# Patient Record
Sex: MALE | Race: WHITE | NOT HISPANIC OR LATINO | Employment: FULL TIME | URBAN - METROPOLITAN AREA
[De-identification: names, ages, dates, MRNs, and addresses within clinical notes are randomized per-mention and may not be internally consistent; named-entity substitution may affect disease eponyms.]

---

## 2017-01-10 ENCOUNTER — OFFICE VISIT - HEALTHEAST (OUTPATIENT)
Dept: FAMILY MEDICINE | Facility: CLINIC | Age: 47
End: 2017-01-10

## 2017-01-10 DIAGNOSIS — Z22.322 MRSA (METHICILLIN RESISTANT STAPH AUREUS) CULTURE POSITIVE: ICD-10-CM

## 2017-01-10 DIAGNOSIS — L40.9 PSORIASIS: ICD-10-CM

## 2017-01-10 DIAGNOSIS — N28.1 SIMPLE RENAL CYST: ICD-10-CM

## 2017-01-26 ENCOUNTER — RECORDS - HEALTHEAST (OUTPATIENT)
Dept: ADMINISTRATIVE | Facility: OTHER | Age: 47
End: 2017-01-26

## 2017-02-09 ENCOUNTER — OFFICE VISIT - HEALTHEAST (OUTPATIENT)
Dept: FAMILY MEDICINE | Facility: CLINIC | Age: 47
End: 2017-02-09

## 2017-02-09 DIAGNOSIS — Z13.1 SCREENING FOR DIABETES MELLITUS: ICD-10-CM

## 2017-02-09 DIAGNOSIS — Z00.00 ROUTINE GENERAL MEDICAL EXAMINATION AT A HEALTH CARE FACILITY: ICD-10-CM

## 2017-02-09 DIAGNOSIS — Z13.220 SCREENING, LIPID: ICD-10-CM

## 2017-02-09 DIAGNOSIS — E55.9 VITAMIN D DEFICIENCY: ICD-10-CM

## 2017-02-09 LAB
CHOLEST SERPL-MCNC: 230 MG/DL
FASTING STATUS PATIENT QL REPORTED: YES
HDLC SERPL-MCNC: 46 MG/DL
LDLC SERPL CALC-MCNC: 146 MG/DL
TRIGL SERPL-MCNC: 190 MG/DL

## 2017-02-09 ASSESSMENT — MIFFLIN-ST. JEOR: SCORE: 1959.14

## 2018-06-05 ENCOUNTER — COMMUNICATION - HEALTHEAST (OUTPATIENT)
Dept: SCHEDULING | Facility: CLINIC | Age: 48
End: 2018-06-05

## 2018-06-11 ENCOUNTER — RECORDS - HEALTHEAST (OUTPATIENT)
Dept: ADMINISTRATIVE | Facility: OTHER | Age: 48
End: 2018-06-11

## 2018-06-13 ENCOUNTER — OFFICE VISIT - HEALTHEAST (OUTPATIENT)
Dept: FAMILY MEDICINE | Facility: CLINIC | Age: 48
End: 2018-06-13

## 2018-06-13 ENCOUNTER — COMMUNICATION - HEALTHEAST (OUTPATIENT)
Dept: FAMILY MEDICINE | Facility: CLINIC | Age: 48
End: 2018-06-13

## 2018-06-13 DIAGNOSIS — M54.12 CERVICAL RADICULOPATHY: ICD-10-CM

## 2018-06-13 ASSESSMENT — MIFFLIN-ST. JEOR: SCORE: 1867.1

## 2018-06-15 ENCOUNTER — RECORDS - HEALTHEAST (OUTPATIENT)
Dept: ADMINISTRATIVE | Facility: OTHER | Age: 48
End: 2018-06-15

## 2018-07-03 ENCOUNTER — RECORDS - HEALTHEAST (OUTPATIENT)
Dept: ADMINISTRATIVE | Facility: OTHER | Age: 48
End: 2018-07-03

## 2018-12-01 ENCOUNTER — COMMUNICATION - HEALTHEAST (OUTPATIENT)
Dept: FAMILY MEDICINE | Facility: CLINIC | Age: 48
End: 2018-12-01

## 2019-05-17 ENCOUNTER — COMMUNICATION - HEALTHEAST (OUTPATIENT)
Dept: FAMILY MEDICINE | Facility: CLINIC | Age: 49
End: 2019-05-17

## 2019-05-17 DIAGNOSIS — L40.9 PSORIASIS: ICD-10-CM

## 2019-06-21 ENCOUNTER — OFFICE VISIT - HEALTHEAST (OUTPATIENT)
Dept: FAMILY MEDICINE | Facility: CLINIC | Age: 49
End: 2019-06-21

## 2019-06-21 DIAGNOSIS — Z00.00 ROUTINE GENERAL MEDICAL EXAMINATION AT A HEALTH CARE FACILITY: ICD-10-CM

## 2019-06-21 DIAGNOSIS — Z13.220 LIPID SCREENING: ICD-10-CM

## 2019-06-21 DIAGNOSIS — L40.9 PSORIASIS: ICD-10-CM

## 2019-06-21 ASSESSMENT — MIFFLIN-ST. JEOR: SCORE: 1948.75

## 2019-07-25 ENCOUNTER — RECORDS - HEALTHEAST (OUTPATIENT)
Dept: ADMINISTRATIVE | Facility: OTHER | Age: 49
End: 2019-07-25

## 2020-02-29 ENCOUNTER — RECORDS - HEALTHEAST (OUTPATIENT)
Dept: ADMINISTRATIVE | Facility: OTHER | Age: 50
End: 2020-02-29

## 2020-03-03 ENCOUNTER — OFFICE VISIT - HEALTHEAST (OUTPATIENT)
Dept: FAMILY MEDICINE | Facility: CLINIC | Age: 50
End: 2020-03-03

## 2020-03-03 ENCOUNTER — AMBULATORY - HEALTHEAST (OUTPATIENT)
Dept: FAMILY MEDICINE | Facility: CLINIC | Age: 50
End: 2020-03-03

## 2020-03-03 DIAGNOSIS — S39.012A BACK STRAIN, INITIAL ENCOUNTER: ICD-10-CM

## 2020-03-03 DIAGNOSIS — S39.91XA INJURY OF ABDOMEN, INITIAL ENCOUNTER: ICD-10-CM

## 2020-03-12 ENCOUNTER — OFFICE VISIT - HEALTHEAST (OUTPATIENT)
Dept: FAMILY MEDICINE | Facility: CLINIC | Age: 50
End: 2020-03-12

## 2020-03-12 DIAGNOSIS — S39.012D BACK STRAIN, SUBSEQUENT ENCOUNTER: ICD-10-CM

## 2020-03-19 ENCOUNTER — OFFICE VISIT - HEALTHEAST (OUTPATIENT)
Dept: PHYSICAL THERAPY | Facility: REHABILITATION | Age: 50
End: 2020-03-19

## 2020-03-19 DIAGNOSIS — M53.86 DECREASED ROM OF LUMBAR SPINE: ICD-10-CM

## 2020-03-19 DIAGNOSIS — R19.8 ABDOMINAL WEAKNESS: ICD-10-CM

## 2020-03-19 DIAGNOSIS — M54.50 ACUTE BILATERAL LOW BACK PAIN WITHOUT SCIATICA: ICD-10-CM

## 2020-04-09 ENCOUNTER — COMMUNICATION - HEALTHEAST (OUTPATIENT)
Dept: PHYSICAL THERAPY | Facility: REHABILITATION | Age: 50
End: 2020-04-09

## 2020-09-24 ENCOUNTER — COMMUNICATION - HEALTHEAST (OUTPATIENT)
Dept: FAMILY MEDICINE | Facility: CLINIC | Age: 50
End: 2020-09-24

## 2020-09-29 ENCOUNTER — COMMUNICATION - HEALTHEAST (OUTPATIENT)
Dept: FAMILY MEDICINE | Facility: CLINIC | Age: 50
End: 2020-09-29

## 2020-10-02 ENCOUNTER — VIRTUAL VISIT (OUTPATIENT)
Dept: FAMILY MEDICINE | Facility: OTHER | Age: 50
End: 2020-10-02
Payer: COMMERCIAL

## 2020-10-02 PROCEDURE — 99421 OL DIG E/M SVC 5-10 MIN: CPT | Performed by: PHYSICIAN ASSISTANT

## 2020-10-03 DIAGNOSIS — Z20.822 SUSPECTED COVID-19 VIRUS INFECTION: Primary | ICD-10-CM

## 2020-10-03 NOTE — PROGRESS NOTES
"Date: 10/02/2020 19:10:55  Clinician: Alley Beltran  Clinician NPI: [id number]  Patient: Go Sen  Patient : 1970  Patient Address: [facility] [address], [place] [address]  Patient Phone: [phone]  Visit Protocol: URI  Patient Summary:  Go is a 50 year old ( : 1970 ) male who initiated a [facility] Visit for COVID-19 (Coronavirus) evaluation and screening. When asked the question \"Please sign me up to receive news, health information and promotions from [facility].\", Go responded \"No\".    Go states his symptoms started today.   His symptoms consist of enlarged lymph nodes, nasal congestion, rhinitis, and a sore throat.   Symptom details     Nasal secretions: The color of his mucus is clear.    Sore throat: Go reports having mild throat pain (1-3 on a 10 point pain scale), does not have exudate on his tonsils, and can swallow liquids. The lymph nodes in his neck are enlarged. A rash has not appeared on the skin since the sore throat started.      Go denies having ear pain, headache, wheezing, fever, cough, anosmia, vomiting, nausea, facial pain or pressure, myalgias, chills, malaise, teeth pain, ageusia, and diarrhea. He also denies taking antibiotic medication in the past month and having recent facial or sinus surgery in the past 60 days. He is not experiencing dyspnea.   Precipitating events  Within the past week, Go has not been exposed to someone with strep throat.   Pertinent COVID-19 (Coronavirus) information  In the past 14 days, Go has worked in a congregate living setting.   He does not work or volunteer as healthcare worker or a  and does not work or volunteer in a healthcare facility. 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 He lives with a healthcare worker.   Go has had a close contact with a laboratory-confirmed " COVID-19 patient within 14 days of symptom onset. He was exposed at his work. Additional information about contact with COVID-19 (Coronavirus) patient as reported by the patient (free text): Was exposed on 9/29/20 And was just told on 10/220   Since December 2019, Go and has not had upper respiratory infection or influenza-like illness. Has not been diagnosed with lab-confirmed COVID-19 test   Pertinent medical history  Go needs a return to work/school note.   Weight: 240 lbs   Go does not smoke or use smokeless tobacco.   Weight: 240 lbs    MEDICATIONS: No current medications, ALLERGIES: NKDA  Clinician Response:  Dear Go,   Your symptoms show that you may have coronavirus (COVID-19). This illness can cause fever, cough and trouble breathing. Many people get a mild case and get better on their own. Some people can get very sick.  What should I do?  We would like to test you for this virus.   1. Please call [phone] to schedule your visit. Explain that you were referred by [facility] to have a COVID-19 test. Be ready to share your [facility] visit ID number.  The following will serve as your written order for this COVID Test, ordered by me, for the indication of suspected COVID [Z20.828]: The test will be ordered in ePatientFinder, our electronic health record, after you are scheduled. It will show as ordered and authorized by Dylon Antonio MD.  Order: COVID-19 (Coronavirus) PCR for SYMPTOMATIC testing from [facility].      2. When it's time for your COVID test:  Stay at least 6 feet away from others. (If someone will drive you to your test, stay in the backseat, as far away from the  as you can.)   Cover your mouth and nose with a mask, tissue or washcloth.  Go straight to the testing site. Don't make any stops on the way there or back.      3.Starting now: Stay home and away from others (self-isolate) until:   You've had no fever---and no medicine that reduces fever---for one full day (24 hours). And...   Your  "other symptoms have gotten better. For example, your cough or breathing has improved. And...   At least 10 days have passed since your symptoms started.       During this time, don't leave the house except for testing or medical care.   Stay in your own room, even for meals. Use your own bathroom if you can.   Stay away from others in your home. No hugging, kissing or shaking hands. No visitors.  Don't go to work, school or anywhere else.    Clean \"high touch\" surfaces often (doorknobs, counters, handles, etc.). Use a household cleaning spray or wipes. You'll find a full list of  on the EPA website: www.epa.gov/pesticide-registration/list-n-disinfectants-use-against-sars-cov-2.   Cover your mouth and nose with a mask, tissue or washcloth to avoid spreading germs.  Wash your hands and face often. Use soap and water.  Caregivers in these groups are at risk for severe illness due to COVID-19:  o People 65 years and older  o People who live in a nursing home or long-term care facility  o People with chronic disease (lung, heart, cancer, diabetes, kidney, liver, immunologic)  o People who have a weakened immune system, including those who:   Are in cancer treatment  Take medicine that weakens the immune system, such as corticosteroids  Had a bone marrow or organ transplant  Have an immune deficiency  Have poorly controlled HIV or AIDS  Are obese (body mass index of 40 or higher)  Smoke regularly   o Caregivers should wear gloves while washing dishes, handling laundry and cleaning bedrooms and bathrooms.  o Use caution when washing and drying laundry: Don't shake dirty laundry, and use the warmest water setting that you can.  o For more tips, go to www.cdc.gov/coronavirus/2019-ncov/downloads/10Things.pdf.    4.Sign up for [facility]. We know it's scary to hear that you might have COVID-19. We want to track your symptoms to make sure you're okay over the next 2 weeks. Please look for an email from [facility] " Loop---this is a free, online program that we'll use to keep in touch. To sign up, follow the link in the email. Learn more at [email or web address]  How can I take care of myself?   Get lots of rest. Drink extra fluids (unless a doctor has told you not to).   Take Tylenol (acetaminophen) for fever or pain. If you have liver or kidney problems, ask your family doctor if it's okay to take Tylenol.   Adults can take either:    650 mg (two 325 mg pills) every 4 to 6 hours, or...   1,000 mg (two 500 mg pills) every 8 hours as needed.    Note: Don't take more than 3,000 mg in one day. Acetaminophen is found in many medicines (both prescribed and over-the-counter medicines). Read all labels to be sure you don't take too much.   For children, check the Tylenol bottle for the right dose. The dose is based on the child's age or weight.    If you have other health problems (like cancer, heart failure, an organ transplant or severe kidney disease): Call your specialty clinic if you don't feel better in the next 2 days.       Know when to call 911. Emergency warning signs include:    Trouble breathing or shortness of breath Pain or pressure in the chest that doesn't go away Feeling confused like you haven't felt before, or not being able to wake up Bluish-colored lips or face.  Where can I get more information?   [facility] -- About COVID-19: [email or web address]   CDC -- What to Do If You're Sick: www.cdc.gov/coronavirus/2019-ncov/about/steps-when-sick.html   CDC -- Ending Home Isolation: www.cdc.gov/coronavirus/2019-ncov/hcp/disposition-in-home-patients.html   CDC -- Caring for Someone: www.cdc.gov/coronavirus/2019-ncov/if-you-are-sick/care-for-someone.html   [facility] -- Interim Guidance for Hospital Discharge to Home: www.health.[facility].[place]/diseases/coronavirus/hcp/hospdischarge.pdf   [facility] clinical trials (COVID-19 research studies): clinicalaffairs.[facility]-clinical-trials     Below are the COVID-19 hotlines at the [place] Department of Health ([facility]). Interpreters are available.    For health questions: Call 677-298-1024 or 1-376.671.5027 (7 a.m. to 7 p.m.) For questions about schools and childcare: Call 638-518-5366 or 1-928.236.1668 (7 a.m. to 7 p.m.)    Diagnosis: Contact with and (suspected) exposure to other viral communicable diseases  Diagnosis ICD: Z20.828   None

## 2021-04-29 ENCOUNTER — OFFICE VISIT - HEALTHEAST (OUTPATIENT)
Dept: FAMILY MEDICINE | Facility: CLINIC | Age: 51
End: 2021-04-29

## 2021-04-29 DIAGNOSIS — R10.9 FLANK PAIN: ICD-10-CM

## 2021-04-29 DIAGNOSIS — L40.9 PSORIASIS: ICD-10-CM

## 2021-04-29 ASSESSMENT — PATIENT HEALTH QUESTIONNAIRE - PHQ9: SUM OF ALL RESPONSES TO PHQ QUESTIONS 1-9: 0

## 2021-04-30 ENCOUNTER — OFFICE VISIT - HEALTHEAST (OUTPATIENT)
Dept: FAMILY MEDICINE | Facility: CLINIC | Age: 51
End: 2021-04-30

## 2021-04-30 DIAGNOSIS — N23 RENAL COLIC: ICD-10-CM

## 2021-04-30 LAB
ALBUMIN SERPL-MCNC: 4.2 G/DL (ref 3.5–5)
ALBUMIN UR-MCNC: NEGATIVE G/DL
ALP SERPL-CCNC: 128 U/L (ref 45–120)
ALT SERPL W P-5'-P-CCNC: 53 U/L (ref 0–45)
ANION GAP SERPL CALCULATED.3IONS-SCNC: 9 MMOL/L (ref 5–18)
APPEARANCE UR: CLEAR
AST SERPL W P-5'-P-CCNC: 26 U/L (ref 0–40)
BILIRUB SERPL-MCNC: 0.3 MG/DL (ref 0–1)
BILIRUB UR QL STRIP: NEGATIVE
BUN SERPL-MCNC: 21 MG/DL (ref 8–22)
CALCIUM SERPL-MCNC: 9.1 MG/DL (ref 8.5–10.5)
CHLORIDE BLD-SCNC: 102 MMOL/L (ref 98–107)
CO2 SERPL-SCNC: 28 MMOL/L (ref 22–31)
COLOR UR AUTO: YELLOW
CREAT SERPL-MCNC: 1.13 MG/DL (ref 0.7–1.3)
ERYTHROCYTE [DISTWIDTH] IN BLOOD BY AUTOMATED COUNT: 12.3 % (ref 11–14.5)
GFR SERPL CREATININE-BSD FRML MDRD: >60 ML/MIN/1.73M2
GLUCOSE BLD-MCNC: 97 MG/DL (ref 70–125)
GLUCOSE UR STRIP-MCNC: NEGATIVE MG/DL
HCT VFR BLD AUTO: 43 % (ref 40–54)
HGB BLD-MCNC: 14.5 G/DL (ref 14–18)
HGB UR QL STRIP: NEGATIVE
KETONES UR STRIP-MCNC: NEGATIVE MG/DL
LEUKOCYTE ESTERASE UR QL STRIP: NEGATIVE
MCH RBC QN AUTO: 29.9 PG (ref 27–34)
MCHC RBC AUTO-ENTMCNC: 33.7 G/DL (ref 32–36)
MCV RBC AUTO: 89 FL (ref 80–100)
NITRATE UR QL: NEGATIVE
PH UR STRIP: 7 [PH] (ref 5–8)
PLATELET # BLD AUTO: 167 THOU/UL (ref 140–440)
PMV BLD AUTO: 10.7 FL (ref 7–10)
POTASSIUM BLD-SCNC: 5 MMOL/L (ref 3.5–5)
PROT SERPL-MCNC: 7.2 G/DL (ref 6–8)
RBC # BLD AUTO: 4.85 MILL/UL (ref 4.4–6.2)
SODIUM SERPL-SCNC: 139 MMOL/L (ref 136–145)
SP GR UR STRIP: 1.02 (ref 1–1.03)
UROBILINOGEN UR STRIP-ACNC: NORMAL
WBC: 7.7 THOU/UL (ref 4–11)

## 2021-05-21 ENCOUNTER — OFFICE VISIT - HEALTHEAST (OUTPATIENT)
Dept: FAMILY MEDICINE | Facility: CLINIC | Age: 51
End: 2021-05-21

## 2021-05-21 DIAGNOSIS — Z11.59 NEED FOR HEPATITIS C SCREENING TEST: ICD-10-CM

## 2021-05-21 DIAGNOSIS — M19.042 ARTHRITIS OF BOTH HANDS: ICD-10-CM

## 2021-05-21 DIAGNOSIS — Z00.00 ROUTINE GENERAL MEDICAL EXAMINATION AT A HEALTH CARE FACILITY: ICD-10-CM

## 2021-05-21 DIAGNOSIS — F43.9 SITUATIONAL STRESS: ICD-10-CM

## 2021-05-21 DIAGNOSIS — Z13.1 SCREENING FOR DIABETES MELLITUS: ICD-10-CM

## 2021-05-21 DIAGNOSIS — L40.9 PSORIASIS: ICD-10-CM

## 2021-05-21 DIAGNOSIS — R35.0 URINARY FREQUENCY: ICD-10-CM

## 2021-05-21 DIAGNOSIS — M19.041 ARTHRITIS OF BOTH HANDS: ICD-10-CM

## 2021-05-21 DIAGNOSIS — Z13.220 SCREENING, LIPID: ICD-10-CM

## 2021-05-21 DIAGNOSIS — Z12.11 SCREENING FOR COLON CANCER: ICD-10-CM

## 2021-05-21 LAB
CHOLEST SERPL-MCNC: 224 MG/DL
FASTING STATUS PATIENT QL REPORTED: YES
FASTING STATUS PATIENT QL REPORTED: YES
GLUCOSE BLD-MCNC: 88 MG/DL (ref 70–99)
HDLC SERPL-MCNC: 47 MG/DL
LDLC SERPL CALC-MCNC: 148 MG/DL
TRIGL SERPL-MCNC: 147 MG/DL

## 2021-05-21 ASSESSMENT — MIFFLIN-ST. JEOR: SCORE: 1968.47

## 2021-05-23 ENCOUNTER — HEALTH MAINTENANCE LETTER (OUTPATIENT)
Age: 51
End: 2021-05-23

## 2021-05-24 ENCOUNTER — COMMUNICATION - HEALTHEAST (OUTPATIENT)
Dept: FAMILY MEDICINE | Facility: CLINIC | Age: 51
End: 2021-05-24

## 2021-05-24 DIAGNOSIS — R39.11 URINARY HESITANCY: ICD-10-CM

## 2021-05-24 LAB
HCV AB SERPL QL IA: NEGATIVE
PSA SERPL-MCNC: 1.7 NG/ML (ref 0–3.5)

## 2021-05-27 ASSESSMENT — PATIENT HEALTH QUESTIONNAIRE - PHQ9: SUM OF ALL RESPONSES TO PHQ QUESTIONS 1-9: 0

## 2021-05-28 NOTE — TELEPHONE ENCOUNTER
Refill declined -  I have not seen him since 2017.  He had an office visit here for an unrelated issues in 6/18.  He is due for a check up, or at minimal a follow up exam    Please let him know

## 2021-05-28 NOTE — TELEPHONE ENCOUNTER
Spoke with pt. He had his check up appointment scheduled with you on 6/21/2019. It's flare-up very bad right now. Can you do refill.

## 2021-05-29 NOTE — PROGRESS NOTES
MALE PREVENTATIVE EXAM    Assessment and Plan:       1. Routine general medical examination at a health care facility    2. Lipid screening  - Lipid [place] FASTING; Standing    3. Psoriasis  - Ambulatory referral to Dermatology  - fluocinonide (LIDEX) 0.05 % external solution; Apply bid to scalp/beard  Dispense: 60 mL; Refill: 2  - triamcinolone (KENALOG) 0.1 % cream; Apply a small amount daily to affected area - for no more than 3 weeks  Dispense: 30 g; Refill: 2    Next follow up:  No follow-ups on file.    Immunization Review  Adult Imm Review: No immunizations due today    I discussed the following with the patient:   Adult Healthy Living: Importance of regular exercise  Healthy nutrition  Getting adequate sleep        Subjective:   Chief Complaint: Go Sen is an 49 y.o. male here for a preventative health visit.     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  His area of interested is reactive attachment disorder. - WE talked about this a bit - he has been working with women in correction and feel some of them who have been diagnosed with  Antisocial personality disorder actually have adult reactive attachment which has never been addressed    Current issues/concerns   - psoriasis is spreading to armpit.  He would like a referral to dermatology and a refill of steroid cream/solution  - he feels a heart ping occasionally at rest- no chest pain with exertion -    -   Lab Results   Component Value Date    LDLCALC 146 (H) 02/09/2017     His wife suggested he have a coronary calcium CT score - I did discuss that     Wife suggested coronary calcium CT score without immediate family history of heart disease and hyperlipidemia as his only risk factor, this might be premature        Healthy Habits  Are you taking a daily aspirin? No  Do you typically exercising at least 40 min, 3-4 times per week?  Yes walks 3-5 miles 4x/week  Do you usually eat at least 4 servings of fruit and  "vegetables a day, include whole grains and fiber and avoid regularly eating high fat foods? NO - keto diet  Have you had an eye exam in the past two years? Yes  Do you see a dentist twice per year? Yes  Do you have any concerns regarding sleep? No    Safety Screen  If you own firearms, are they secured in a locked gun cabinet or with trigger locks? The patient does not own any firearms  Do you feel you are safe where you are living?: Yes (6/21/2019  3:59 PM)  Do you feel you are safe in your relationship(s)?: Yes (6/21/2019  3:59 PM)      Review of Systems:   Constitutional: negative for recent illness or change in weight  Eyes: negative for irritation and vision change  Ears, nose, mouth, throat, and face: negative for nasal congestion and sore throat  Respiratory: negative for cough and dyspnea on exertion  Cardiovascular: negative for chest pain and palpitations  Gastrointestinal: negative for abdominal pain and change in bowel habits  Genitourinary:negative for dysuria, frequency and hesitancy  Integument/breast: negative for rash  Hematologic/lymphatic: negative for bleeding and easy bruising  Musculoskeletal:negative for arthralgias and myalgias  Neurological: negative for dizziness, headaches and paresthesia      Cancer Screening     Patient has no health maintenance due at this time            Objective:   Vital Signs:   Visit Vitals  /80 (Patient Site: Right Arm, Patient Position: Sitting, Cuff Size: Adult Regular)   Pulse 76   Ht 5' 10.5\" (1.791 m)   Wt (!) 238 lb (108 kg)   BMI 33.67 kg/m           PHYSICAL EXAM  General appearance - alert, well appearing, and in no distress  Mental status - normal mood, behavior, speech, dress, motor activity, and thought processes  Eyes - pupils equal and reactive, extraocular eye movements intact, funduscopic exam normal, discs flat and sharp  Ears - bilateral TM's and external ear canals normal  Mouth - mucous membranes moist, pharynx normal without " lesions  Neck - supple, no significant adenopathy, carotids upstroke normal bilaterally, no bruits, thyroid exam: thyroid is normal in size without nodules or tenderness  Chest - clear to auscultation, no wheezes, rales or rhonchi, symmetric air entry  Heart - normal rate, regular rhythm, normal S1, S2, no murmurs, rubs, clicks or gallops  Abdomen - soft, nontender, nondistended, no masses or organomegaly  Neurological - alert, oriented, normal speech, no focal findings or movement disorder noted, DTR's normal and symmetric  Extremities - peripheral pulses normal, no pedal edema, no clubbing or cyanosis  Skin - no rashes or worrisome lesions

## 2021-05-30 VITALS — BODY MASS INDEX: 34.65 KG/M2 | HEIGHT: 70 IN | WEIGHT: 242.04 LBS

## 2021-05-30 VITALS — BODY MASS INDEX: 35.73 KG/M2 | WEIGHT: 249.04 LBS

## 2021-06-01 VITALS — BODY MASS INDEX: 31.74 KG/M2 | HEIGHT: 70 IN | WEIGHT: 221.75 LBS

## 2021-06-03 VITALS — HEIGHT: 71 IN | WEIGHT: 238 LBS | BODY MASS INDEX: 33.32 KG/M2

## 2021-06-04 VITALS
HEART RATE: 71 BPM | BODY MASS INDEX: 33.95 KG/M2 | WEIGHT: 240 LBS | OXYGEN SATURATION: 97 % | SYSTOLIC BLOOD PRESSURE: 128 MMHG | DIASTOLIC BLOOD PRESSURE: 82 MMHG

## 2021-06-04 VITALS
WEIGHT: 241.75 LBS | DIASTOLIC BLOOD PRESSURE: 80 MMHG | SYSTOLIC BLOOD PRESSURE: 126 MMHG | HEART RATE: 73 BPM | OXYGEN SATURATION: 99 % | BODY MASS INDEX: 34.2 KG/M2

## 2021-06-05 VITALS
BODY MASS INDEX: 35.35 KG/M2 | WEIGHT: 249.9 LBS | HEART RATE: 81 BPM | OXYGEN SATURATION: 98 % | TEMPERATURE: 97.7 F | DIASTOLIC BLOOD PRESSURE: 80 MMHG | SYSTOLIC BLOOD PRESSURE: 124 MMHG

## 2021-06-06 ENCOUNTER — AMBULATORY - HEALTHEAST (OUTPATIENT)
Dept: SURGERY | Facility: AMBULATORY SURGERY CENTER | Age: 51
End: 2021-06-06

## 2021-06-06 DIAGNOSIS — Z11.59 ENCOUNTER FOR SCREENING FOR OTHER VIRAL DISEASES: ICD-10-CM
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
## 2021-06-07 NOTE — TELEPHONE ENCOUNTER
Called patient for status check in. No answer, so LVM. Info provided that Telehealth is available as an option to substitute an in-clinic visit. Encouraged to follow-up with PT via MyChart or  with any questions or concerns.    Harjinder Hung, PT, DPT

## 2021-06-07 NOTE — PROGRESS NOTES
[facility]   Initial Evaluation    Patient Name: Go Sen  Date of evaluation: 3/19/2020  PRECAUTIONS: none  Referral Diagnosis: Back strain, subsequent encounter   Referring provider: Zaida Tian MD  Visit Diagnosis:     ICD-10-CM    1. Acute bilateral low back pain without sciatica  M54.5    2. Decreased ROM of lumbar spine  M53.86    3. Abdominal weakness  R19.8        Assessment:      Pt. is appropriate for skilled PT intervention as outlined in the Plan of Care (POC).  Pt. is a good candidate for skilled PT services to improve pain levels and function.  Goals and POC established in collaboration with the patient.  Pt presents to PT with acute left>right-sided low back pain with mobility deficits s/p fall 2/29/20.  Reports to be improving since initial injuries, but with pain and limitations outlined below.  HEP initiated today, and patient with good tolerance for all.    Goals:  Pt. will be independent with home exercise program in : 6 weeks  Pt. will have improved quality of sleep: waking less times/night;in 6 weeks  Pt. will bend: to dress;with no pain;in 6 weeks  Patient will twist/turn : in bed;with no pain;in 6 weeks    Patient will decrease : BRISA score;in 6 weeks;for improved quality of life;by _ points  by ___ points: >= 30% from eval      Patient's expectations/goals are realistic.    Barriers to Learning or Achieving Goals:  No Barriers.       Plan / Patient Instructions:        Plan of Care:   Communication with: Referral Source  Patient Related Instruction: Nature of Condition;Treatment plan and rationale;Self Care instruction;Basis of treatment;Body mechanics;Posture;Precautions;Next steps;Expected outcome  Times per Week: 1-2  Number of Weeks: 6  Number of Visits: 12  Therapeutic Exercise: ROM;Stretching;Strengthening  Neuromuscular Reeducation: kinesio tape;core  Manual Therapy: soft tissue mobilization;joint mobilization;muscle energy;myofascial  "release  Modalities: traction;TENS;hot pack (PRN)      Plan for next visit: Review HEP. Add further 4ped stabilization as tolerated.     Subjective:       History of Present Illness:    Go Watts) is a 50 y.o. male who presents to therapy today with complaints of acute bilateral low back pain.  Denies LE radiating pain or paresthesias into LE.  Described as pain, tightness, weakness.   Onset: 20. Fell onto his left side while cross country skiing.  Duration: Constant. Does feel pain to be improving.  Worse with prolonged ambulation (up to about 1 mile), prolonged sitting (20-30 min max), rolling over in bed, sit>stand  Better with pain medication (Flexeril), ice, chiropractor  Pain Medication: Currently taking gabapentin 3x/day and Flexeril.  Sleep: Reports waking due to pain, which he relates may be due to stress.  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  Enjoys weight lifting and walking.  Of note, does also note tailbone pain, worse with prolonged sitting, but occasionally with other movements as well.    Denies previous low back surgeries.    Pt seeks PT to \"get better.\"    Pain Ratin  Pain rating at best: 3  Pain rating at worst: 9     Objective:      Patient Outcome Measures :    Modified Oswestry Low Back Pain Disablity Questionnaire  in %: 60     Scores range from 0-100%, where a score of 0% represents minimal pain and maximal function. The minimal clinically important difference is a score reduction of 12%.    Examination  1. Acute bilateral low back pain without sciatica     2. Decreased ROM of lumbar spine     3. Abdominal weakness       Involved side: Bilateral  Posture Observation:      General sitting posture is  normal.  General standing posture is normal.    Pt quite guarded and with increased pain with sit>stand, supine<>sit, and rolling L/R.  Lumbar ROM: All % of WNL  Date:      *Indicate scale AROM AROM AROM   Lumbar Flexion 50     Lumbar " Extension 50      Right Left Right Left Right Left   Lumbar Sidebending 50 50       Lumbar Rotation 75 75       Thoracic Flexion      Thoracic Extension      Thoracic Sidebending         Thoracic Rotation           SLR: 25* bilat.  Hip flexion PROM to 110* bilat with mild ERP in low back.  Hypomobility and pain present with central PAs t/o lumbar spine. Increased muscle spasm present bilat lumbar paraspinals and glutes.    Treatment Today     TREATMENT MINUTES COMMENTS   Evaluation 20    Self-care/ Home management     Manual therapy 10 Prone central PAs grade II to lumbar spine to decrease pain   Neuromuscular Re-education     Therapeutic Activity     Therapeutic Exercises 25 Exercises:  Exercise #1: Prone > RAHAT > prone press up - H  Comment #1: x60 sec > x60 sec > 10x  Exercise #2: LTR; SKTC sciatic nerve glides - H  Comment #2: 10x2 sec; 10x B  Exercise #3: PPT - H  Comment #3: 10x 3 sec (attempted bridges, but too PF- was given as part of HEP to progress to as able)  Exercise #4: Seated PIRI ER stretch - H  Comment #4: 2x30 sec bilat     Gait training     Modality__________________                Total 55    Blank areas are intentional and mean the treatment did not include these items.            PT Evaluation Code: (Please list factors)  Patient History/Comorbidities: none  Examination: Acute LBP  Clinical Presentation: Stable  Clinical Decision Making: Low    Patient History/  Comorbidities Examination  (body structures and functions, activity limitations, and/or participation restrictions) Clinical Presentation Clinical Decision Making (Complexity)   No documented Comorbidities or personal factors 1-2 Elements Stable and/or uncomplicated Low   1-2 documented comorbidities or personal factor 3 Elements Evolving clinical presentation with changing characteristics Moderate   3-4 documented comorbidities or personal factors 4 or more Unstable and unpredictable High           Navi Hung  3/19/2020  2:33  PM    [facility] Discharge Summary  Patient Name: Go Sen  Date: 4/28/2020  Referral Diagnosis: Back strain, subsequent encounter   Referring provider: Zaida Tian MD  Visit Diagnosis:   1. Acute bilateral low back pain without sciatica     2. Decreased ROM of lumbar spine     3. Abdominal weakness         Goals: *Unable to comment on completion of goals due to lack of further follow-up with PT.  Pt. will be independent with home exercise program in : 6 weeks  Pt. will have improved quality of sleep: waking less times/night;in 6 weeks  Pt. will bend: to dress;with no pain;in 6 weeks  Patient will twist/turn : in bed;with no pain;in 6 weeks    Patient will decrease : BRISA score;in 6 weeks;for improved quality of life;by _ points  by ___ points: >= 30% from eval      Patient was seen for initial evaluation and treatment on 3/19/20 only.   Pt received HEP for self-management of sx. Pt was called on 4/9/20 for status check in and to be informed of telehealth visit options.  Pt has not contacted PT for any further follow-up.    Therapy will be discontinued at this time.  The patient will need a new referral to resume.    Thank you for your referral.  Navi Hung  4/28/2020  11:42 AM

## 2021-06-08 NOTE — PROGRESS NOTES
Assessment and Plan    1. Psoriasis  Steroids have traditionally worked for him and I have refilled these; he would also like a referral to a dermatologist  - fluocinonide (LIDEX) 0.05 % cream; Apply topically 2 (two) times a day.  Dispense: 30 g; Refill: 2  - fluocinonide (LIDEX) 0.05 % external solution; Apply bid to scalp/beard  Dispense: 60 mL; Refill: 2  - Ambulatory referral to Dermatology    2. MRSA (methicillin resistant staph aureus) culture positive  This was found on [place] culture of [place] [facility] [place]. He had initially wanted a referral to infectious disease.  However he has no ongoing symptoms.  I reviewed recommendations with him from U- to-date on  Reducing nasal carriage by treating with mupirocin for 10 days    3. Simple renal cyst  This was found on review of records, ultrasound 3/26/14 - recommendation was for him to have follow up ultrasound or CT  - US Kidney Right; Future     Tdap through work, therefore not in record - he will check in to this  Zaida Tian MD     -------------------------------------------    Chief Complaint   Patient presents with     Referral     infectious disease      Go comes to establish care here. 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  He had a culture done for MRSA in a boil in his axilla - has had two rounds of clindamycin. He would like to get rid of this infection permanently if possible    Review of his record included a diagnosis of kidney cysts from his [place] at [facility].  There is also an ultrasound date 3/26/14 with the following results:  Impression:  1. Echogenic, coarsened liver suggesting hepatic steatosis. No hepatic mass lesion.  2. 2.2 cm hypoechoic lesion along the lateral aspect of the right kidney, most likely a, located cyst. Consider  short-term 6 month followup ultrasound versus MR of the kidneys to further evaluate.    I have personally reviewed the image(s) and initial interpretation, and I agree with the findings as documented by the resident/fellow.      [place] Radiologist: George Lance Resident: Eduardo Felix       ---------------------      Patient Active Problem List   Diagnosis     Psoriasis     Vitamin D deficiency     Simple renal cyst         Current Outpatient Prescriptions:      fluocinonide (LIDEX) 0.05 % cream, Apply topically 2 (two) times a day., Disp: 30 g, Rfl: 2     fluocinonide (LIDEX) 0.05 % external solution, Apply bid to scalp/beard, Disp: 60 mL, Rfl: 2     mupirocin (BACTROBAN) 2 % ointment, Apply to affected area 3 times daily with qtip, Disp: 22 g, Rfl: 0      Health Maintenance Due   Topic Date Due     TDAP ADULT ONE TIME DOSE  01/02/1988     Health Maintenance reviewed -Tdap done at his work site - he will try to get date of this    History   Smoking Status     Former Smoker     Quit date: 6/30/1998   Smokeless Tobacco     Not on file       History   Alcohol use Not on file       Review of Systems - feeling generally well    Vitals:    01/10/17 0943   BP: 130/72   Pulse: 88   Temp: 98.6  F (37  C)   SpO2: 98%     Body mass index is 35.73 kg/(m^2).     EXAM:    General appearance - alert, well appearing, and in no distress and obese  Skin - full skin exam not done, however large psoriatic plaques noted on volar surface of hands bilaterally

## 2021-06-08 NOTE — PROGRESS NOTES
1. Routine general medical examination at a health care facility    2. Screening, lipid  - Lipid Cascade    3. Screening for diabetes mellitus  - Glucose    4. Vitamin D deficiency  - Vitamin D, Total (25-Hydroxy)    Regarding occasional short-lasting asymptomatic irregular heart beats - such sensations are fairly common and without red flags I think further workup is unnecessary.    Counseling:  Diet  immunizations  Exercise  Preventive maintenance    Zaida Tian MD    ------------------        Do you have any concerns today?    1) previously had irregular heart beat.  He had a 24 hour monitor with inconclusive results, but the provider asked to see him again in 6 months, and he didn't followed up.  He only feels these irregular beats occasionally now, and has no chest pains or light headedness. He says every once in a while he  feels a weird sensation.     Habits:  Exercise: right now he is in school - walk as 3-5 miles per day - would like to get back into the gym and do more  Diet: 500 calories one day, then fast (vegetables - stops eating around 7, next day anything he wants - has lost 20 pounds  Do you take any herbs or supplements that were not prescribed by a doctor? no  Are you taking calcium supplements? no  Are you taking aspirin daily? no  Do you wear seat belts? yes  Do you bike or ride a motorcycle? Do you wear a helmet? NA  Abuse screen: neg    History   Smoking Status     Former Smoker     Quit date: 6/30/1998   Smokeless Tobacco     Not on file   12 pac years    History   Alcohol use Not on file   Twice a week       History:  Are you sexually active? yes  Does your partner need to use family planning? yes IUD  Last sti screen - no concerns -     Social: , in school for psychology; has a grown step daughter    Preventive  BP Readings from Last 3 Encounters:   02/09/17 110/78   01/10/17 130/72   12/23/16 120/80     Immunization History   Administered Date(s) Administered     Hep A,  historic 09/13/2005     Hep B, Peds, Historic 03/12/2008, 04/14/2008, 09/17/2008     Hep B, historic 09/13/2005     IPV 09/13/2005     Influenza, inj, historic 10/02/2013, 09/29/2015, 10/01/2016     Td, historic 09/13/2005     Tdap 01/01/2016     Typhoid, ViCPs 09/13/2005         Patient Active Problem List   Diagnosis     Psoriasis     Vitamin D deficiency     Simple renal cyst         Current Outpatient Prescriptions:      fluocinonide (LIDEX) 0.05 % cream, Apply topically 2 (two) times a day., Disp: 30 g, Rfl: 2     fluticasone (FLONASE) 50 mcg/actuation nasal spray, 2 sprays into each nostril., Disp: , Rfl:      pseudoephedrine (SUDAFED) 60 MG tablet, Take 60 mg by mouth., Disp: , Rfl:         Review of Systems  Do you have pain that bothers you in your daily life? no    Constitutional: negative for weight change or recent illness  Eyes: negative for change in vision  Ears, nose, mouth, throat, and face: negative for sore throat and nasal drainage  Respiratory: negative for cough or dyspnea  Cardiovascular: negative for chest pain and palpitations; no pain ins calves with walking  Gastrointestinal: negative for abdominal pain and change in bowel habits  Genitourinary:negative for dysuria  Breast: negative for lumps or pains  Integument: no rashes or lesions  Musculoskeletal:negative for arthralgias and myalgias  Neurological: negative for dizziness, headaches and paresthesia  Behavioral/Psych: negative for depression     Sometimes urinates a lot at the adithya of the day.  IF he drinks tea wakes up twice in the middle of the night.  Same with beers.  No hesitancy.  No pain with urination        Objective:     Vitals:    02/09/17 0947   BP: 110/78   Pulse: 68   Resp: 18     Body mass index is 34.73 kg/(m^2).  General appearance - alert, well appearing, and in no distress and obese  Mental status - alert, oriented to person, place, and time  Eyes - funduscopic exam normal, discs flat and sharp  Ears - bilateral TM's  and external ear canals normal  Mouth - mucous membranes moist, pharynx normal without lesions  Neck - supple, no significant adenopathy  Chest - clear to auscultation, no wheezes, rales or rhonchi, symmetric air entry  Heart - normal rate, regular rhythm, normal S1, S2, no murmurs, rubs, clicks or gallops  Abdomen - soft, nontender, nondistended, no masses or organomegaly  Musculoskeletal - no joint tenderness, deformity or swelling  Extremities - peripheral pulses present in feet but hard to find - skin is normal, no edeam  Skin - no rashes or worrsisome lesions; many freckles

## 2021-06-11 NOTE — TELEPHONE ENCOUNTER
SENT [facility] message with Dr. Tian's message. Asked patient to call back to schedule his px.     ZULEYKA/PAOLA

## 2021-06-11 NOTE — TELEPHONE ENCOUNTER
"Wendy is due for check up, fasting labs, preventive care.  Please call him to schedule a FASTING annual exam - thank you    ----- Message from Admin, Epic sent at 2020 11:04 PM CDT -----  Regarding: Cancellation of Order # [id number]  Order number [id number] for the procedure LIPID CASCADE [[id number]]   has been canceled by Admin, Epic [ADMIN]. This procedure was   ordered by Zaida Tian MD [[id number]] on 2019 for the   patient Go Sen [[id number]]. The reason for cancellation   was \"Order \".    This was a standing order with 1 occurrences remaining.    "

## 2021-06-16 PROBLEM — M47.812 CERVICAL SPONDYLOSIS WITHOUT MYELOPATHY: Status: ACTIVE | Noted: 2019-01-04

## 2021-06-17 NOTE — PATIENT INSTRUCTIONS - HE
If not improved in 2 days. Call for CT scan     If pain increases, you get a fever, or just feel worse got to ER. Will likely need CT scan.

## 2021-06-17 NOTE — PROGRESS NOTES
SUBJECTIVE       Go Sen is a 51 y.o.  male with a PMH significant for:     Patient Active Problem List   Diagnosis     Psoriasis     Vitamin D deficiency     Simple renal cyst     Cervical spondylosis without myelopathy     He presents with back pain.    Patient developed continuous back pain 2 days ago. He describes it is dull. With movements or deep breaths he gets a sharp stabbing. Patient locates it over his left lower thoracic. Patient notes some nausea in the mornings. Patient feels fatigued in the evenings. Patient denies any fever or chills. Patient denies any dysuria or hematuria. Patient denies any recent trauma to the back. Patient notes this does not feel like his regular back pain. Patient has never had a kidney stone. Patient saw Dr. Tian via phone yesterday and was advised to be seen in person.    PMH, Medications and Allergies were reviewed and updated as needed.        REVIEW OF SYSTEMS     Pertinent items are noted in HPI.        OBJECTIVE     Vitals:    04/30/21 1157   BP: 124/80   Patient Site: Left Arm   Patient Position: Sitting   Cuff Size: Adult Regular   Pulse: 81   Temp: 97.7  F (36.5  C)   TempSrc: Tympanic   SpO2: 98%   Weight: (!) 249 lb 14.4 oz (113.4 kg)     Body mass index is 35.35 kg/m .    Constitutional: Awake, alert, cooperative, no apparent distress, and appears stated age.  Eyes: Lids and lashes normal, sclera clear, conjunctiva normal.  ENT: Normocephalic, without obvious abnormality, atramatic,   Lungs: No increased work of breathing, good air exchange, clear to auscultation bilaterally, no crackles or wheezing.  Cardiovascular: Regular rate and rhythm, normal S1 and S2, no S3 or S4, and no murmur noted.  Abdomen: Normal bowel sounds, soft, non-distended, non-tender, no masses palpated, no hepatosplenomegally.  Musculoskeletal: No redness, warmth, or swelling of the joints.  Full range of motion noted. Positive Destin sign left  Neurologic: Awake, alert,  oriented to name, place and time.  Cranial nerves II-XII are grossly intact and gait is normal.      ASSESSMENT AND PLAN     Go was seen today for flank pain.    Diagnoses and all orders for this visit:    Renal colic: Patient having back pain consistent with renal pathology. Patient has a positive Destin sign. Discussed need for CT scan to evaluate what is going on. Suspect that this is likely a kidney stone. Patient notes his pain is well controlled with Tylenol and ibuprofen. He denies any fevers or chills. Patient would like to hold off on the CT scan if he is able. Advised that we could get some basic blood labs today. Should his pain not improve by Monday do the CT. Should his pain worsen or he develop any new symptoms patient should go to the ER for CT scan. Provided patient with dangelo. Patient in agreement with this plan  -     HM2(CBC w/o Differential)  -     Comprehensive Metabolic Panel  -     UA    RTC if develops new or worsening symptoms.    Delma Mckeon DO
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
## 2021-06-18 NOTE — PROGRESS NOTES
"SUBJECTIVE: Go Sen is a 48 y.o. male with:  Chief Complaint   Patient presents with     Results     MRI     Shoulder Pain     f/u     Neck Pain     f/u    He woke up with left shoulder pain 1 month ago.  Happened several times.  No injury/ trauma/ overuse of the shoulder.  [place] 3 [place] and had more pain.  Pain has been more persistent.  Went to  in [place].  Had xray that was negative.  He took 3 days of steroids/ muscle relaxants/ Relafen.  No improvement.  Saw his chiropractor twice then had worsening pain so went to [facility]- had negative cervical spine xray.  Took steroids for 6 days/ different muscle relaxant and no results.  Saw chiro again and MRI was ordered from [facility].    He has had some neck pain in last few days.  Pain goes to left forearm- sharp/ stabbing/ achy.  Feels like dog is biting his left arm.  He has numbness/tingling in all fingers and palm of left hand.  Arms does feel weak - has dropped some things.  Left handed.  Right arm / shoulder is ok.  He has tried ice every 20 minutes.  Also, using Biofreeze before bed.  He is asking for pain medication so he can sleep.  Has used Vicodan in past with no side effects.  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  He does have appointment this coming week for cortisone injection in his neck with [facility].    SH: Single but lives with significant other.  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  Just finished master's in psychology at [employer].  Patient Active Problem List   Diagnosis     Psoriasis     Vitamin D deficiency     Simple renal cyst      OBJECTIVE: /80 (Patient Site: Left Arm, Patient Position: Sitting, Cuff Size: Adult Regular)  Pulse 94  Ht 5' 10\" (1.778 m)  Wt 221 lb 12 oz (100.6 kg)  SpO2 97%  BMI 31.82 kg/m2 no distress  Neck: Supple.  No C-spine tenderness.  Decreased range of motion especially with lateral " rotation to right.  Shoulders: Good range of motion.  Resisted abduction 5/5 equal bilaterally.  upper extremities: DTRs symmetric.  Motor -  strength 5/5 on right and 4/5 on left.  Resisted flexion / extension of arms 5/5.  Neuro: AAOx3.  Normal gait.    MRI of c-spine from [facility]: 3 mm AP left foraminal herniation and osteophyte C5-6 with severe left nerve root canal stenosis/ C6 root compression and mild central stenosis/ cord abutment.    Go was seen today for results, shoulder pain and neck pain.    Diagnoses and all orders for this visit:    Cervical radiculopathy- Evidence for C5-6 nerve impingement on left on MRI which explains his symptoms.  He has appointment for injection.  If not improved may need neurosurgical consult.  I will prescribe a small supply of Vicodan to use a bedtime- anticipate short term use of narcotics only.  Will start him on gabapentin also.  -     HYDROcodone-acetaminophen 5-325 mg per tablet; Take 1 po at bedtime PRN  -     gabapentin (NEURONTIN) 300 MG capsule; Take 1 po at bedtime     Keep appointment with [facility] and bring in MRI results for them to review.  Call if any worsening of symptoms.    Andie Gonzalez

## 2021-06-20 NOTE — LETTER
Letter by Zaida Tian MD at      Author: Zaida Tian MD Service: -- Author Type: --    Filed:  Encounter Date: 3/3/2020 Status: (Other)         March 3, 2020     Patient: Go Sen   YOB: 1970   Date of Visit: 3/3/2020       To Whom It May Concern:    It is my medical opinion that Go Sen suffered abdominal trauma and a back strain when he fell down while skiing.  Any kind of movement is painful for him at this point.  I believe he will need at least two weeks until he can get to the point where he is moving more easily.  I will re-evaluate him in a week.    If you have any questions or concerns, please don't hesitate to call.    Sincerely,        Electronically signed by Zaida Tian MD

## 2021-06-24 ENCOUNTER — COMMUNICATION - HEALTHEAST (OUTPATIENT)
Dept: FAMILY MEDICINE | Facility: CLINIC | Age: 51
End: 2021-06-24

## 2021-06-24 DIAGNOSIS — R39.11 URINARY HESITANCY: ICD-10-CM

## 2021-06-26 ENCOUNTER — HOSPITAL ENCOUNTER (OUTPATIENT)
Facility: AMBULATORY SURGERY CENTER | Age: 51
End: 2021-06-26
Attending: SURGERY
Payer: COMMERCIAL

## 2021-06-26 NOTE — TELEPHONE ENCOUNTER
"RN cannot approve Refill Request    RN can NOT refill this medication per chart note -> \"Further refills given once tolerance and efficacy established\" -> Therefore routing to provider for refill decision. Thank you..   Last office visit:  5/21/21.    Ana Rosa Ann, [facility] Triage/Med Refill 6/24/2021    Requested Prescriptions   Pending Prescriptions Disp Refills     tamsulosin (FLOMAX) 0.4 mg cap [Pharmacy Med Name: TAMSULOSIN HCL 0.4 MG CAPSULE] 30 capsule 1     Sig: TAKE 1 CAP BY MOUTH DAILY. FURTHER REFILLS ONCE TOLERANCE AND EFFICACY HAVE BEEN ESTABLISHED       Alfuzosin/Tamsulosin/Silodosin Refill Protocol  Passed - 6/23/2021  7:30 AM        Passed - PCP or prescribing provider visit in past 12 months       Last office visit with prescriber/PCP: 3/12/2020 Zaida Tian MD OR same dept: 4/30/2021 Delma Mckeon DO OR same specialty: 4/30/2021 Delma Mckeon DO  Last physical: 5/21/2021 Last MTM visit: Visit date not found   Next visit within 3 mo: Visit date not found  Next physical within 3 mo: Visit date not found  Prescriber OR PCP: Zaida Tian MD  Last diagnosis associated with med order: 1. Urinary hesitancy  - tamsulosin (FLOMAX) 0.4 mg cap [Pharmacy Med Name: TAMSULOSIN HCL 0.4 MG CAPSULE]; TAKE 1 CAP BY MOUTH DAILY. FURTHER REFILLS ONCE TOLERANCE AND EFFICACY HAVE BEEN ESTABLISHED  Dispense: 30 capsule; Refill: 1    If protocol passes may refill for 12 months if within 3 months of last provider visit (or a total of 15 months).                   "

## 2021-06-29 DIAGNOSIS — Z11.59 ENCOUNTER FOR SCREENING FOR OTHER VIRAL DISEASES: ICD-10-CM

## 2021-07-06 VITALS
HEIGHT: 70 IN | SYSTOLIC BLOOD PRESSURE: 118 MMHG | BODY MASS INDEX: 35.1 KG/M2 | OXYGEN SATURATION: 97 % | HEART RATE: 73 BPM | TEMPERATURE: 98.1 F | WEIGHT: 245.2 LBS | DIASTOLIC BLOOD PRESSURE: 82 MMHG

## 2021-07-06 NOTE — TELEPHONE ENCOUNTER
Telephone Encounter by Zaida Tian MD at 7/6/2021  5:15 PM     Author: Zaida Tian MD Service: -- Author Type: Physician    Filed: 7/6/2021  5:16 PM Encounter Date: 6/23/2021 Status: Signed    : Zaida Tian MD (Physician)       He still has not replied to my message -please call him:    Shree Stiles,     I got a refill request for Tamsulosin.You should have one refill left on your original RX.  I'm happy to send a 90 day supply, but just want to fist verify that this is working well for without bad side effects.     Zaida Tian MD

## 2021-07-07 ENCOUNTER — COMMUNICATION - HEALTHEAST (OUTPATIENT)
Dept: INTERNAL MEDICINE | Facility: CLINIC | Age: 51
End: 2021-07-07

## 2021-07-07 DIAGNOSIS — R39.11 URINARY HESITANCY: ICD-10-CM

## 2021-07-07 NOTE — TELEPHONE ENCOUNTER
Telephone Encounter by Tamika Baca at 7/7/2021 10:45 AM     Author: Tamika Baca Service: -- Author Type: Patient Access    Filed: 7/7/2021 10:46 AM Encounter Date: 7/7/2021 Status: Signed    : Tamika Baca (Patient Access)       Reason for Call:  Medication or medication refill:    tamsulosin (FLOMAX) 0.4 mg cap    Do you use a [facility]?  Name of the pharmacy and phone number for the current request: [facility] in [place], [place]    Name of the medication requested: tamsulosin (FLOMAX) 0.4 mg cap    Other request: Pharmacy would not send request. Stated pt was to call PCP to discuss tolerance to medication before refill.    Can we leave a detailed message on this number? Yes    Phone number patient can be reached at: Home number on file [phone] (home)    Best Time:     Call taken on 7/7/2021 at 10:45 AM by Tamika Baca

## 2021-07-07 NOTE — TELEPHONE ENCOUNTER
Telephone Encounter by Kate Carvalho CMA at 7/7/2021 11:55 AM     Author: Kate Carvalho CMA Service: -- Author Type: Certified Medical Assistant    Filed: 7/7/2021 11:56 AM Encounter Date: 7/7/2021 Status: Signed    : Kate Carvalho CMA (Certified Medical Assistant)       Left message to call back for: Go  Information to relay to patient:  Need to know if he is tolerating the medication or having any side effects    Kate Carvalho CMA ([facility])

## 2021-07-07 NOTE — TELEPHONE ENCOUNTER
Telephone Encounter by Johanne Saba CMA at 7/7/2021  2:10 PM     Author: Johanne Saba CMA Service: -- Author Type: Certified Medical Assistant    Filed: 7/7/2021  2:11 PM Encounter Date: 6/23/2021 Status: Signed    : Johanne Saba CMA (Certified Medical Assistant)       Message has already been left for patient to call back and let us know on how he is doing on this medication. If it's working well and if not experiencing any bad side effects.     GF/RMA

## 2021-07-07 NOTE — TELEPHONE ENCOUNTER
Telephone Encounter by Andie Jean LPN at 7/7/2021  2:32 PM     Author: Andie Jean LPN Service: -- Author Type: Licensed Nurse    Filed: 7/7/2021  2:33 PM Encounter Date: 6/23/2021 Status: Signed    : Andie Jean LPN (Licensed Nurse)       Left message to call back for: Go De La Cruz to relay to patient:  How is tamsulosin working for him?  Any side effects?

## 2021-07-08 NOTE — TELEPHONE ENCOUNTER
Telephone Encounter by Johanne Saba CMA at 7/8/2021 12:55 PM     Author: Johanne Saba CMA Service: -- Author Type: Certified Medical Assistant    Filed: 7/8/2021 12:56 PM Encounter Date: 7/7/2021 Status: Signed    : Johanne Saba CMA (Certified Medical Assistant)       See rx encounter. Message have been left for patient to call back to give us an update on this medication.    GF/PAOLA

## 2021-07-08 NOTE — TELEPHONE ENCOUNTER
Telephone Encounter by Andie Jean LPN at 7/8/2021 12:26 PM     Author: Andie Jean LPN Service: -- Author Type: Licensed Nurse    Filed: 7/8/2021 12:26 PM Encounter Date: 6/23/2021 Status: Signed    : Andie Jean LPN (Licensed Nurse)       Left message to call back for: Go De La Cruz to relay to patient:  How is tamsulosin working for him?  Any side effects?

## 2021-07-08 NOTE — TELEPHONE ENCOUNTER
Telephone Encounter by Behr, Pamela at 7/8/2021  2:33 PM     Author: Behr, Pamela Service: -- Author Type: --    Filed: 7/8/2021  2:34 PM Encounter Date: 7/7/2021 Status: Signed    : Behr, Pamela       No side effects and tolerating good per pt calling back.

## 2021-07-09 NOTE — TELEPHONE ENCOUNTER
Telephone Encounter by Andie Jean LPN at 7/9/2021  8:30 AM     Author: Andie Jean LPN Service: -- Author Type: Licensed Nurse    Filed: 7/9/2021  8:31 AM Encounter Date: 6/23/2021 Status: Signed    : Andie Jean LPN (Licensed Nurse)       Patient did not return our calls.  Does provider want to fill medication?

## 2021-07-09 NOTE — TELEPHONE ENCOUNTER
Telephone Encounter by Zaida Tian MD at 7/9/2021 10:23 AM     Author: Zaida Tian MD Service: -- Author Type: Physician    Filed: 7/9/2021 10:26 AM Encounter Date: 6/23/2021 Status: Signed    : Zaida Tian MD (Physician)       I melissa give 30 days and put need for him to contact us on refill

## 2021-07-15 ENCOUNTER — VIRTUAL VISIT (OUTPATIENT)
Dept: RHEUMATOLOGY | Facility: CLINIC | Age: 51
End: 2021-07-15
Payer: OTHER MISCELLANEOUS

## 2021-07-15 DIAGNOSIS — Z78.9 REGULAR ALCOHOL CONSUMPTION: ICD-10-CM

## 2021-07-15 DIAGNOSIS — M79.641 BILATERAL HAND PAIN: ICD-10-CM

## 2021-07-15 DIAGNOSIS — L40.9 PSORIASIS: ICD-10-CM

## 2021-07-15 DIAGNOSIS — M79.642 BILATERAL HAND PAIN: ICD-10-CM

## 2021-07-15 DIAGNOSIS — M19.90 INFLAMMATORY ARTHRITIS: Primary | ICD-10-CM

## 2021-07-15 DIAGNOSIS — R74.01 ELEVATED ALT MEASUREMENT: ICD-10-CM

## 2021-07-15 PROCEDURE — 99204 OFFICE O/P NEW MOD 45 MIN: CPT | Mod: 95 | Performed by: INTERNAL MEDICINE

## 2021-07-15 RX ORDER — FLUOCINONIDE TOPICAL SOLUTION USP, 0.05% 0.5 MG/ML
SOLUTION TOPICAL
COMMUNITY
Start: 2021-04-29 | End: 2022-03-02

## 2021-07-15 RX ORDER — MULTIVIT-MIN/IRON FUM/FOLIC AC 7.5 MG-4
TABLET ORAL
COMMUNITY

## 2021-07-15 RX ORDER — TAMSULOSIN HYDROCHLORIDE 0.4 MG/1
0.4 CAPSULE ORAL
COMMUNITY
Start: 2021-07-09 | End: 2022-01-19

## 2021-07-15 RX ORDER — TRIAMCINOLONE ACETONIDE 1 MG/G
CREAM TOPICAL
COMMUNITY
Start: 2021-05-21

## 2021-07-15 NOTE — PROGRESS NOTES
Go Sen who presents today with a chief complaint of  No chief complaint on file.      Joint Pains: Yes  Location: hands  Onset: couple years  Intensity: 4 /10  AM Stiffness: none  Alleviating/Aggravating Factors: movement increase pain.  Medications helpful?  Tolerating Meds: Yes  Other:      ROS:  Patient denies having: persistent dry eyes, dry mouth, recurrent oral ulcers, patchy alopecia, active rashes, photosensitivity, history of psoriasis+, active chest pain, active shortness of breath, active cough, active dysuria, some increase in urination improved with Flomax states testing for diabetes was negative, history of kidney stones, active abdominal pain, active diarrhea, history of hematochezia, active dysphagia, history of peptic ulcer disease, history of HIV, tuberculosis, hepatitis B or C, Lyme disease, seizure history, raynaud's, active documented fevers, recent infections, difficulty sleeping or chronic unrefreshing sleep, involuntary weight loss, loss of appetite, excessive fatigue, depression, anxiety,  recurrent sinus infections, history of inflammatory eye diseases (such as uveitis, scleritis, iritis, etc).       Information gathered by medical assistant incorporated into this note, was reviewed and discussed with the patient.    Problem List:  Patient Active Problem List   Diagnosis     Psoriasis     Vitamin D deficiency     Simple renal cyst     Cervical spondylosis without myelopathy        PMH:   No past medical history on file.    Surgical History:  No past surgical history on file.    Family History:  Family History   Problem Relation Age of Onset     Hodgkin's lymphoma Sister      No Known Problems Mother      No Known Problems Father      No Known Problems Maternal Grandmother      Coronary Artery Disease Maternal Grandfather 72.00     Alzheimer Disease Paternal Grandmother 78.00     Cerebrovascular Disease Paternal Grandfather        Social History:   reports that he quit smoking about  23 years ago. He has never used smokeless tobacco.    Allergies:  No Known Allergies     Current Medications:  Current Outpatient Medications   Medication Sig Dispense Refill     amoxicillin-clavulanate (AUGMENTIN) 875-125 MG per tablet Take 1 tablet by mouth 2 times daily. 28 tablet 0     FLUOXETINE HCL PO Take  by mouth.             Physical Exam:  Following up today via video visit, per Covid-19 pandemic requirements.    Verbal consent has been obtained for this service by care team member.    Video call start time: 4:07 PM, 4:12 PM    Video call end time: 4:33 PM    D[employer] utilized for video call.    Phone number utilized: [phone]    Patient location for video visit: Home     Provider location for video visit:  Home (working remotely)      Summary/Assessment:    Pleasant 51-year-old male with pertinent past medical history of psoriasis presents with chronic joint pains.    Patient explains that he was diagnosed with psoriasis as a child and with time when seen providers told may have psoriatic arthritis contributing to his chronic hand pains.     has been experiencing right greater than left hand pains for about 3 to 4 years.    Admits to having some swelling involving some hand joints.    On needle exam points to been having discomfort involving right greater than left MCPs/PIPs.  Some mild fullness noted involving right second through fourth MCPs.    Currently takes ibuprofen periodically with some partial benefit.     was on Otezla for about a year, discontinued at onset of Covid, has not restarted since.  Adds that when on Otezla both his psoriasis and joint pains significantly improved.  Otezla at the time prescribed by dermatology.  Denies seeing a rheumatologist in the past.    Regarding psoriasis states when present typically affects extensor surfaces of elbows and knees.  Can also affect his axilla and eyebrows.    States his father has rheumatoid arthritis and does not have  psoriasis.    Patient admits to having either 1 beer or wine daily.    Noted to have very mildly elevated ALT liver enzyme.    Given the above, patient appears to have an inflammatory arthritis contributing to some fullness involving MCP joints, right greater than left.  Given history of psoriasis psoriatic arthritis is a possibility.  Also in the differential is rheumatoid arthritis given family history (father).  Will obtain some labs/x-rays and correlate clinically to screen for possible underlying etiologies.    Please see below for management plan.      Pertinent rheumatology/past medical history (please refer to above for more detailed history):      Inflammatory arthritis (right greater than left hands, pain mainly MCPs/PIPs, fullness over right second through fourth MCPs).    Psoriasis (since childhood)    Family history for rheumatoid arthritis (father)    Mildly elevated ALT and alk phos    Regular alcohol consumption (1 beer or wine daily)    History of cervical spondylosis    History of vitamin D deficiency      Rheumatology medications provided/suggested:          Pertinent medication from other providers or from otc (please refer to above for more detailed med list):    Ibuprofen as needed  Triamcinolone 0.1 percent cream  Lidex topical steroid  Fluoxetine/Prozac    Pertinent medications already tried:     Otezla      Pertinent lab history:    Negative/unremarkable: Hep C antibody, creatinine, glucose, urinalysis      Pertinent imaging/test history:          Other:    Marital status:       How many kids:  none    Type of work:  Yes     Drinking alcohol: yes, a glass (beer or wine) a day    Tobacco use: no      Recreational drug use: no         Plan:      We will obtain some labs, thereafter patient is to contact us to review and based on results we will consider restarting Otezla.      Can continue OTC ibuprofen as needed.    Has topical steroids for psoriasis.    Given her mildly elevated ALT  liver enzyme, recommended he cut back on alcohol beverage intake.    Will obtain x-rays of: Bilateral hands    Patient expressed understanding and agreement to above plan.    Follow-up in 4 months, preferably in clinic.      Procedure note:     Total time spent 49 minutes involved with patient care, includes placing orders, reviewing records and andformulating management plan.       Major side effect profile of medications provided/suggested were discussed with the patient.    This note was transcribed using Dragon voice recognition software as a result unintentional grammatical errors or word substitutions may have occurred. Please contact our Rheumatology department if you need any clarification or if you have any related inquiries.    Thank you for referring this patient to our clinic.      Reggie Lozada DO ...................  7/15/2021   2:24 PM

## 2021-07-15 NOTE — PATIENT INSTRUCTIONS
Summary of Your Rheumatology Visit    Next Appointment:   4 Months, in clinic    Medications:      Referrals:      Tests:      Please have labs that were ordered performed, about a week thereafter should contact us to review and consider restarting Otezla, as discussed.    Please have x-rays that were ordered performed.       Injections:      Other:

## 2021-07-26 ENCOUNTER — LAB (OUTPATIENT)
Dept: LAB | Facility: CLINIC | Age: 51
End: 2021-07-26
Payer: COMMERCIAL

## 2021-07-26 DIAGNOSIS — M19.90 INFLAMMATORY ARTHRITIS: ICD-10-CM

## 2021-07-26 LAB
ALT SERPL W P-5'-P-CCNC: 48 U/L (ref 0–45)
AST SERPL W P-5'-P-CCNC: 22 U/L (ref 0–40)
BASOPHILS # BLD AUTO: 0 10E3/UL (ref 0–0.2)
BASOPHILS NFR BLD AUTO: 1 %
C REACTIVE PROTEIN LHE: 0.8 MG/DL (ref 0–0.8)
EOSINOPHIL # BLD AUTO: 0.2 10E3/UL (ref 0–0.7)
EOSINOPHIL NFR BLD AUTO: 2 %
ERYTHROCYTE [DISTWIDTH] IN BLOOD BY AUTOMATED COUNT: 12.7 % (ref 10–15)
ERYTHROCYTE [SEDIMENTATION RATE] IN BLOOD BY WESTERGREN METHOD: 5 MM/HR (ref 0–15)
HCT VFR BLD AUTO: 40.3 % (ref 40–53)
HGB BLD-MCNC: 13.4 G/DL (ref 13.3–17.7)
IMM GRANULOCYTES # BLD: 0 10E3/UL
IMM GRANULOCYTES NFR BLD: 0 %
LYMPHOCYTES # BLD AUTO: 1.8 10E3/UL (ref 0.8–5.3)
LYMPHOCYTES NFR BLD AUTO: 26 %
MCH RBC QN AUTO: 30 PG (ref 26.5–33)
MCHC RBC AUTO-ENTMCNC: 33.3 G/DL (ref 31.5–36.5)
MCV RBC AUTO: 90 FL (ref 78–100)
MONOCYTES # BLD AUTO: 0.3 10E3/UL (ref 0–1.3)
MONOCYTES NFR BLD AUTO: 5 %
NEUTROPHILS # BLD AUTO: 4.7 10E3/UL (ref 1.6–8.3)
NEUTROPHILS NFR BLD AUTO: 67 %
PLATELET # BLD AUTO: 166 10E3/UL (ref 150–450)
RBC # BLD AUTO: 4.47 10E6/UL (ref 4.4–5.9)
RHEUMATOID FACT SER NEPH-ACNC: <15 IU/ML (ref 0–30)
URATE SERPL-MCNC: 6.8 MG/DL (ref 3–8)
WBC # BLD AUTO: 7.1 10E3/UL (ref 4–11)

## 2021-07-26 PROCEDURE — 86141 C-REACTIVE PROTEIN HS: CPT

## 2021-07-26 PROCEDURE — 84460 ALANINE AMINO (ALT) (SGPT): CPT

## 2021-07-26 PROCEDURE — 84550 ASSAY OF BLOOD/URIC ACID: CPT

## 2021-07-26 PROCEDURE — 86200 CCP ANTIBODY: CPT

## 2021-07-26 PROCEDURE — 85025 COMPLETE CBC W/AUTO DIFF WBC: CPT

## 2021-07-26 PROCEDURE — 84450 TRANSFERASE (AST) (SGOT): CPT

## 2021-07-26 PROCEDURE — 86431 RHEUMATOID FACTOR QUANT: CPT

## 2021-07-26 PROCEDURE — 36415 COLL VENOUS BLD VENIPUNCTURE: CPT

## 2021-07-26 PROCEDURE — 86481 TB AG RESPONSE T-CELL SUSP: CPT

## 2021-07-26 PROCEDURE — 87340 HEPATITIS B SURFACE AG IA: CPT

## 2021-07-26 PROCEDURE — 85652 RBC SED RATE AUTOMATED: CPT

## 2021-07-26 PROCEDURE — 81374 HLA I TYPING 1 ANTIGEN LR: CPT

## 2021-07-27 LAB
CCP AB SER IA-ACNC: 0.5 U/ML
GAMMA INTERFERON BACKGROUND BLD IA-ACNC: 0.01 IU/ML
HBV SURFACE AG SERPL QL IA: NONREACTIVE
M TB IFN-G BLD-IMP: NEGATIVE
M TB IFN-G CD4+ BCKGRND COR BLD-ACNC: 9.99 IU/ML
MITOGEN IGNF BCKGRD COR BLD-ACNC: 0.01 IU/ML
MITOGEN IGNF BCKGRD COR BLD-ACNC: 0.01 IU/ML
QUANTIFERON MITOGEN: 10 IU/ML
QUANTIFERON NIL TUBE: 0.01 IU/ML
QUANTIFERON TB1 TUBE: 0.02 IU/ML
QUANTIFERON TB2 TUBE: 0.02

## 2021-08-03 LAB
B LOCUS: NORMAL
B27TEST METHOD: NORMAL

## 2021-08-12 DIAGNOSIS — Z12.11 SPECIAL SCREENING FOR MALIGNANT NEOPLASMS, COLON: ICD-10-CM

## 2021-08-17 NOTE — PROGRESS NOTES
This encounter was created solely for the purpose of releasing the future order that was placed for Cologuard.  This is a necessary step in order for the results to be abstracted once they are available.  Jyoti Perez

## 2021-09-12 ENCOUNTER — HEALTH MAINTENANCE LETTER (OUTPATIENT)
Age: 51
End: 2021-09-12

## 2021-09-16 ENCOUNTER — DOCUMENTATION ONLY (OUTPATIENT)
Dept: BEHAVIORAL HEALTH | Facility: CLINIC | Age: 51
End: 2021-09-16

## 2021-09-17 NOTE — PROGRESS NOTES
Patient did not show for his 4 PM video visit this afternoon.  Attempted to reach him by phone, left voice mail with encouragement to call this provider if he is still interested in scheduling appointments.

## 2021-12-06 ENCOUNTER — MYC MEDICAL ADVICE (OUTPATIENT)
Dept: FAMILY MEDICINE | Facility: CLINIC | Age: 51
End: 2021-12-06
Payer: COMMERCIAL

## 2021-12-06 DIAGNOSIS — R39.11 URINARY HESITANCY: Primary | ICD-10-CM

## 2021-12-07 RX ORDER — TAMSULOSIN HYDROCHLORIDE 0.4 MG/1
0.8 CAPSULE ORAL DAILY
Qty: 60 CAPSULE | Refills: 1 | Status: SHIPPED | OUTPATIENT
Start: 2021-12-07 | End: 2022-01-02

## 2021-12-30 DIAGNOSIS — R39.11 URINARY HESITANCY: ICD-10-CM

## 2022-01-02 RX ORDER — TAMSULOSIN HYDROCHLORIDE 0.4 MG/1
CAPSULE ORAL
Qty: 60 CAPSULE | Refills: 0 | Status: SHIPPED | OUTPATIENT
Start: 2022-01-02 | End: 2022-01-17

## 2022-01-03 NOTE — TELEPHONE ENCOUNTER
"Last Written Prescription Date:  12/7/21  Last Fill Quantity: 60,  # refills: 1   Last office visit provider:  5/21/21     Requested Prescriptions   Pending Prescriptions Disp Refills     tamsulosin (FLOMAX) 0.4 MG capsule [Pharmacy Med Name: TAMSULOSIN HCL 0.4 MG CAPSULE] 60 capsule 1     Sig: TAKE 2 CAPSULES BY MOUTH EVERY DAY       Alpha Blockers Passed - 12/30/2021  1:31 PM        Passed - Blood pressure under 140/90 in past 12 months     BP Readings from Last 3 Encounters:   05/21/21 118/82   04/30/21 124/80   03/12/20 128/82                 Passed - Recent (12 mo) or future (30 days) visit within the authorizing provider's specialty     Patient has had an office visit with the authorizing provider or a provider within the authorizing providers department within the previous 12 mos or has a future within next 30 days. See \"Patient Info\" tab in inbasket, or \"Choose Columns\" in Meds & Orders section of the refill encounter.              Passed - Patient does not have Tadalafil, Vardenafil, or Sildenafil on their medication list        Passed - Medication is active on med list        Passed - Patient is 18 years of age or older             julio vail RN 01/02/22 8:07 PM  "

## 2022-01-05 DIAGNOSIS — R39.11 HESITANCY OF MICTURITION: ICD-10-CM

## 2022-01-07 RX ORDER — TAMSULOSIN HYDROCHLORIDE 0.4 MG/1
CAPSULE ORAL
Qty: 90 CAPSULE | Refills: 1 | OUTPATIENT
Start: 2022-01-07

## 2022-01-07 NOTE — TELEPHONE ENCOUNTER
My chart message to him:    Shree Stiles,     I will send A prescription for 0.8 mg (two pills) daily- can't go higher than that.  Also, if you happen to miss a few days (like out of town and forget to bring pills) you start back at 0.4 mg for 3 day before going back up to 0.8 mg     Please let me know if the new dose works.     Zaida Tian MD    tamsulosin (FLOMAX) 0.4 MG capsule 60 capsule 0 1/2/2022  No   Sig: TAKE 2 CAPSULES BY MOUTH EVERY DAY   Sent to pharmacy as: Tamsulosin HCl 0.4 MG Oral Capsule (FLOMAX)   Class: E-Prescribe   Order: [id number]   E-Prescribing Status: Receipt confirmed by pharmacy (1/2/2022  8:09 PM CST)

## 2022-01-07 NOTE — TELEPHONE ENCOUNTER
"Routing refill request to provider for review/approval because:  Early refill requested.    Last Written Prescription Date:  1/2/22  Last Fill Quantity: 60,  # refills: 0   Last office visit provider:  5/21/21     Requested Prescriptions   Pending Prescriptions Disp Refills     tamsulosin (FLOMAX) 0.4 MG capsule [Pharmacy Med Name: TAMSULOSIN HCL 0.4 MG CAPSULE] 90 capsule 1     Sig: TAKE 1 CAPSULE BY MOUTH DAILY. FURTHER REFILLS ONCE TOLERANCE AND EFFICACY HAVE BEEN ESTABLISHED       Alpha Blockers Passed - 1/5/2022 12:36 AM        Passed - Blood pressure under 140/90 in past 12 months     BP Readings from Last 3 Encounters:   05/21/21 118/82   04/30/21 124/80   03/12/20 128/82                 Passed - Recent (12 mo) or future (30 days) visit within the authorizing provider's specialty     Patient has had an office visit with the authorizing provider or a provider within the authorizing providers department within the previous 12 mos or has a future within next 30 days. See \"Patient Info\" tab in inbasket, or \"Choose Columns\" in Meds & Orders section of the refill encounter.              Passed - Patient does not have Tadalafil, Vardenafil, or Sildenafil on their medication list        Passed - Medication is active on med list        Passed - Patient is 18 years of age or older             Brayan Mendez RN 01/07/22 9:34 AM  "

## 2022-01-17 ENCOUNTER — MYC REFILL (OUTPATIENT)
Dept: FAMILY MEDICINE | Facility: CLINIC | Age: 52
End: 2022-01-17
Payer: COMMERCIAL

## 2022-01-17 DIAGNOSIS — R39.11 URINARY HESITANCY: ICD-10-CM

## 2022-01-19 DIAGNOSIS — R39.11 URINARY HESITANCY: Primary | ICD-10-CM

## 2022-01-19 NOTE — TELEPHONE ENCOUNTER
"Routing refill request to provider for review/approval because:  Drug not on the [facility] refill protocol     Last Written Prescription Date:  1/2/22  Last Fill Quantity: 60,  # refills: 0   Last office visit provider:  5/21/21     Requested Prescriptions   Pending Prescriptions Disp Refills     tamsulosin (FLOMAX) 0.4 MG capsule 60 capsule 0       Alpha Blockers Passed - 1/17/2022  6:53 AM        Passed - Blood pressure under 140/90 in past 12 months     BP Readings from Last 3 Encounters:   05/21/21 118/82   04/30/21 124/80   03/12/20 128/82                 Passed - Recent (12 mo) or future (30 days) visit within the authorizing provider's specialty     Patient has had an office visit with the authorizing provider or a provider within the authorizing providers department within the previous 12 mos or has a future within next 30 days. See \"Patient Info\" tab in inbasket, or \"Choose Columns\" in Meds & Orders section of the refill encounter.              Passed - Patient does not have Tadalafil, Vardenafil, or Sildenafil on their medication list        Passed - Medication is active on med list        Passed - Patient is 18 years of age or older             Cassandra Gurrola RN 01/19/22 7:59 AM  "

## 2022-01-20 RX ORDER — TAMSULOSIN HYDROCHLORIDE 0.4 MG/1
0.8 CAPSULE ORAL DAILY
Qty: 180 CAPSULE | Refills: 1 | Status: SHIPPED | OUTPATIENT
Start: 2022-02-01 | End: 2022-08-09

## 2022-01-21 NOTE — TELEPHONE ENCOUNTER
[employer] message to patient (to which is reply was seems to be working)    Shree Stiles,     I will send A prescription for 0.8 mg (two pills) daily- can't go higher than that.  Also, if you happen to miss a few days (like out of town and forget to bring pills) you start back at 0.4 mg for 3 day before going back up to 0.8 mg     Please let me know if the new dose works.     Zaida Tian MD  --    Post dated prescription for 2/1/22

## 2022-01-22 RX ORDER — TAMSULOSIN HYDROCHLORIDE 0.4 MG/1
0.4 CAPSULE ORAL DAILY
Qty: 180 CAPSULE | Refills: 0 | Status: SHIPPED | OUTPATIENT
Start: 2022-01-22 | End: 2022-03-02 | Stop reason: ALTCHOICE

## 2022-01-22 NOTE — TELEPHONE ENCOUNTER
"  Last office visit provider:  5/21/21      Requested Prescriptions   Pending Prescriptions Disp Refills     tamsulosin (FLOMAX) 0.4 MG capsule 180 capsule      Sig: Take 1 capsule (0.4 mg) by mouth       Alpha Blockers Passed - 1/19/2022  3:27 PM        Passed - Blood pressure under 140/90 in past 12 months     BP Readings from Last 3 Encounters:   05/21/21 118/82   04/30/21 124/80   03/12/20 128/82                 Passed - Recent (12 mo) or future (30 days) visit within the authorizing provider's specialty     Patient has had an office visit with the authorizing provider or a provider within the authorizing providers department within the previous 12 mos or has a future within next 30 days. See \"Patient Info\" tab in inbasket, or \"Choose Columns\" in Meds & Orders section of the refill encounter.              Passed - Patient does not have Tadalafil, Vardenafil, or Sildenafil on their medication list        Passed - Medication is active on med list        Passed - Patient is 18 years of age or older             julio vail RN 01/22/22 5:08 PM  "

## 2022-03-02 ENCOUNTER — VIRTUAL VISIT (OUTPATIENT)
Dept: INTERNAL MEDICINE | Facility: CLINIC | Age: 52
End: 2022-03-02
Payer: COMMERCIAL

## 2022-03-02 DIAGNOSIS — B97.89 VIRAL RESPIRATORY INFECTION: ICD-10-CM

## 2022-03-02 DIAGNOSIS — R74.01 ELEVATED ALANINE AMINOTRANSFERASE (ALT) LEVEL: ICD-10-CM

## 2022-03-02 DIAGNOSIS — Z00.00 PREVENTATIVE HEALTH CARE: ICD-10-CM

## 2022-03-02 DIAGNOSIS — M10.9 URIC ACID ARTHROPATHY: ICD-10-CM

## 2022-03-02 DIAGNOSIS — E55.9 VITAMIN D DEFICIENCY: ICD-10-CM

## 2022-03-02 DIAGNOSIS — L40.9 PSORIASIS: ICD-10-CM

## 2022-03-02 DIAGNOSIS — A08.4 VIRAL GASTROENTERITIS: Primary | ICD-10-CM

## 2022-03-02 DIAGNOSIS — J98.8 VIRAL RESPIRATORY INFECTION: ICD-10-CM

## 2022-03-02 PROCEDURE — 99215 OFFICE O/P EST HI 40 MIN: CPT | Mod: GT | Performed by: INTERNAL MEDICINE

## 2022-03-02 RX ORDER — PREDNISONE 20 MG/1
20 TABLET ORAL DAILY
Qty: 4 TABLET | Refills: 0 | Status: SHIPPED | OUTPATIENT
Start: 2022-03-02 | End: 2022-03-06

## 2022-03-02 RX ORDER — ALLOPURINOL 300 MG/1
300 TABLET ORAL DAILY
Qty: 30 TABLET | Refills: 11 | Status: SHIPPED | OUTPATIENT
Start: 2022-03-02 | End: 2023-03-02

## 2022-03-02 NOTE — LETTER
[facility]  [address] [place] [address]  [phone]  Dept: [phone]      3/2/2022    Re: Go Sen      TO WHOM IT MAY CONCERN:    Go Sen  was seen on March 2 via video.  Please excuse him  until March 7 due to illness    He became ill February 27 and currently missed work February 28, March 1 and March 2.    Cordially          Alli Barney MD  [facility]

## 2022-03-02 NOTE — PATIENT INSTRUCTIONS
Work slip stay off work February 28 to March 4    Liver ultrasound    Ferritin and iron binding studies    Allopurinol 300 mg daily 1 month trial    Prednisone 20 mg daily for 4 days    Bilateral hand x-rays as previously ordered    Routine vaccines ordered

## 2022-03-02 NOTE — PROGRESS NOTES
Go is a 52 year old male contacting the clinic today via video, who will use the platform: [employer] for the visit.  Phone # for [employer], or if Amwell drops:   Telephone Information:   Mobile [phone]          ASSESSMENT and PLAN:  1. Viral gastroenteritis  Possible Covid.  Okay for work note.  Routine symptomatic care discussed    2. Viral respiratory infection  Minimal respiratory symptoms    3. Vitamin D deficiency  Stable    4. Psoriasis  Was on Otezla in the past for psoriasis which also helped joint pain    5. Uric acid arthropathy  Discussed that uric acid level 6.8 could be high enough to cause intermittent joint pain.  Follow through with hand x-rays as ordered by rheumatology.  Trial of allopurinol and prednisone to differentiate psoriatic arthritis from uric acid arthritis  - XR Hands Bilateral PA View; Future  - allopurinol (ZYLOPRIM) 300 MG tablet; Take 1 tablet (300 mg) by mouth daily  Dispense: 30 tablet; Refill: 11  - predniSONE (DELTASONE) 20 MG tablet; Take 1 tablet (20 mg) by mouth daily for 4 days  Dispense: 4 tablet; Refill: 0    6. Elevated alanine aminotransferase (ALT) level  Discussed differential diagnosis.  Alcohol use minimal.  Possible hemochromatosis, autoimmune hepatitis or fatty liver.  Routine work-up  - Ferritin; Future  - Iron & Iron Binding Capacity; Future  - Hemochromatosis mutation; Future  - US Abdomen Limited; Future    7. Preventative health care  Reviewed.  - REVIEW OF HEALTH MAINTENANCE PROTOCOL ORDERS  - ZOSTER VACCINE RECOMBINANT (Shingrix)     Patient Instructions   Work slip stay off work February 28 to March 4    Liver ultrasound    Ferritin and iron binding studies    Allopurinol 300 mg daily 1 month trial    Prednisone 20 mg daily for 4 days    Bilateral hand x-rays as previously ordered    Routine vaccines ordered        Medication list carefully reviewed and corrected  Epic Merger Problem list addressed and updated     Return in about 3 months (around  "6/2/2022) for using a video visit.    CHIEF COMPLAINT:  Chief Complaint   Patient presents with     Sick     ill since Sun - Needs note for work - diarrhea,achy, fatigue, no appetite, slight cough       HISTORY OF PRESENT ILLNESS:  Go is a 52 year old male contacting the clinic today via video for complaints of viral syndrome.  He became ill on February 27 and has missed work February 28, March 1, and March 2.  Symptoms include malaise, arthralgias, myalgias, and diarrhea.  No fever or cough or respiratory symptoms.  Home Covid test negative x1    Medication list reviewed.  He is only taking Flomax    History of elevated ALT over the last year.  Etiology unclear.  No family history of diabetes or hemochromatosis.  Liver never imaged    Has history of psoriasis.  Saw a rheumatology.  Inflammatory markers slightly elevated.  Uric acid level 6.8 and we discussed types of uric acid joint pain separate from gout.  He previously was on medication which helped his psoriasis and his joint pain but he has been off this.  Rheumatology also recommend hand x-rays which he did not follow through with    REVIEW OF SYSTEMS:   Intermittent joint pains.  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  There were no vitals taken for this visit. Estimated body mass index is 35.5 kg/m  as calculated from the following:    Height as of 5/21/21: 1.77 m (5' 9.69\").    Weight as of 5/21/21: 111.2 kg (245 lb 3.2 oz).    PHYSICAL EXAM:  (observations via Video)  Alert and oriented.  Quiet    MEDICATIONS:   Current Outpatient Medications   Medication Sig Dispense Refill     allopurinol (ZYLOPRIM) 300 MG tablet Take 1 tablet (300 mg) by mouth daily 30 tablet 11 "     multivitamins w/minerals tablet        predniSONE (DELTASONE) 20 MG tablet Take 1 tablet (20 mg) by mouth daily for 4 days 4 tablet 0     tamsulosin (FLOMAX) 0.4 MG capsule Take 2 capsules (0.8 mg) by mouth daily 180 capsule 1     triamcinolone (KENALOG) 0.1 % external cream Apply a small amount daily to affected area - for no more than 3 weeks         Outside Notes summarized: Rheumatology note  Labs, x-rays, cardiology, GI tests reviewed: Uric acid 6.8.  CRP and ESR slightly elevated  Recent Labs   Lab Test 07/26/21  1520 07/26/21  1519 05/21/21  0910 04/30/21  1226   HGB 13.4  --   --  14.5   NA  --   --   --  139   POTASSIUM  --   --   --  5.0   CR  --   --   --  1.13   PSA  --   --  1.7  --    URIC  --  6.8  --   --      No results found for: DOUTL65EGB  No results found for: VITDT  No components found for: VITD  Lab Results   Component Value Date    CHOL 224 05/21/2021     New orders:   Orders Placed This Encounter   Procedures     REVIEW OF HEALTH MAINTENANCE PROTOCOL ORDERS     US Abdomen Limited     XR Hands Bilateral PA View     ZOSTER VACCINE RECOMBINANT (Shingrix)     Ferritin     Iron & Iron Binding Capacity       Independent review of:    Supplemental history by:      Patient has given verbal consent to a Video visit?  Yes  How would you like to obtain your AVS?  MyChart  Will anyone else be joining your video visit? No        Video-Visit Details  Type of service:  Video Visit  Originating Location (pt. Location): Home  Distant Location (provider location):   [facility]    Alli Barney MD        [facility]    Video Start Time: 9:49 AM  Video End time:  10:32 AM  Face to face plus orders: 43 minutes  Documentation time:  3 minutes     The visit lasted a total of 44 minutes

## 2022-03-03 ENCOUNTER — ANCILLARY PROCEDURE (OUTPATIENT)
Dept: GENERAL RADIOLOGY | Facility: CLINIC | Age: 52
End: 2022-03-03
Attending: INTERNAL MEDICINE
Payer: COMMERCIAL

## 2022-03-03 ENCOUNTER — LAB (OUTPATIENT)
Dept: LAB | Facility: CLINIC | Age: 52
End: 2022-03-03

## 2022-03-03 DIAGNOSIS — R74.01 ELEVATED ALANINE AMINOTRANSFERASE (ALT) LEVEL: ICD-10-CM

## 2022-03-03 LAB
FERRITIN SERPL-MCNC: 286 NG/ML (ref 27–300)
IRON SATN MFR SERPL: 34 % (ref 15–46)
IRON SERPL-MCNC: 114 UG/DL (ref 35–180)
LAB DIRECTOR COMMENTS: NORMAL
LAB DIRECTOR DISCLAIMER: NORMAL
LAB DIRECTOR INTERPRETATION: NORMAL
LAB DIRECTOR METHODOLOGY: NORMAL
LAB DIRECTOR RESULTS: NORMAL
SPECIMEN DESCRIPTION: NORMAL
TIBC SERPL-MCNC: 335 UG/DL (ref 240–430)

## 2022-03-03 PROCEDURE — 83550 IRON BINDING TEST: CPT

## 2022-03-03 PROCEDURE — 36415 COLL VENOUS BLD VENIPUNCTURE: CPT

## 2022-03-03 PROCEDURE — 82728 ASSAY OF FERRITIN: CPT

## 2022-03-03 PROCEDURE — G0452 MOLECULAR PATHOLOGY INTERPR: HCPCS | Performed by: PATHOLOGY

## 2022-03-03 PROCEDURE — 73120 X-RAY EXAM OF HAND: CPT | Mod: TC | Performed by: RADIOLOGY

## 2022-03-03 PROCEDURE — 81256 HFE GENE: CPT

## 2022-03-07 ENCOUNTER — HOSPITAL ENCOUNTER (OUTPATIENT)
Dept: ULTRASOUND IMAGING | Facility: CLINIC | Age: 52
Discharge: HOME | End: 2022-03-07
Attending: INTERNAL MEDICINE | Admitting: INTERNAL MEDICINE
Payer: COMMERCIAL

## 2022-03-07 DIAGNOSIS — R74.01 ELEVATED ALANINE AMINOTRANSFERASE (ALT) LEVEL: ICD-10-CM

## 2022-03-07 PROCEDURE — 76705 ECHO EXAM OF ABDOMEN: CPT

## 2022-03-10 ENCOUNTER — MYC MEDICAL ADVICE (OUTPATIENT)
Dept: INTERNAL MEDICINE | Facility: CLINIC | Age: 52
End: 2022-03-10
Payer: COMMERCIAL

## 2022-03-24 DIAGNOSIS — M10.9 URIC ACID ARTHROPATHY: ICD-10-CM

## 2022-03-25 RX ORDER — ALLOPURINOL 300 MG/1
TABLET ORAL
Qty: 30 TABLET | Refills: 11 | OUTPATIENT
Start: 2022-03-25

## 2022-03-25 NOTE — TELEPHONE ENCOUNTER
Refill Request refused- patient should have refills on file.    Allopurinil 300mg was refilled on 3/2/2022. Qty-30, ref- 11        Asia Barajas RN  03/25/22 2:19 PM  [facility] Nurse Advisor

## 2022-08-14 ENCOUNTER — HEALTH MAINTENANCE LETTER (OUTPATIENT)
Age: 52
End: 2022-08-14

## 2022-09-12 DIAGNOSIS — R39.11 URINARY HESITANCY: ICD-10-CM

## 2022-09-12 NOTE — TELEPHONE ENCOUNTER
"Routing refill request to provider for review/approval because:  Patient needs to be seen because it has been more than 1 year since last office visit.    Last Written Prescription Date:  8/9/22  Last Fill Quantity: 60,  # refills: 0   Last office visit provider:  5/21/21     Requested Prescriptions   Pending Prescriptions Disp Refills     tamsulosin (FLOMAX) 0.4 MG capsule [Pharmacy Med Name: TAMSULOSIN HCL 0.4 MG CAPSULE] 60 capsule 0     Sig: TAKE 2 CAPSULES BY MOUTH EVERY DAY       Alpha Blockers Failed - 9/12/2022  8:31 AM        Failed - Blood pressure under 140/90 in past 12 months     BP Readings from Last 3 Encounters:   05/21/21 118/82   04/30/21 124/80   03/12/20 128/82                 Failed - Recent (12 mo) or future (30 days) visit within the authorizing provider's specialty     Patient has had an office visit with the authorizing provider or a provider within the authorizing providers department within the previous 12 mos or has a future within next 30 days. See \"Patient Info\" tab in inbasket, or \"Choose Columns\" in Meds & Orders section of the refill encounter.              Passed - Patient does not have Tadalafil, Vardenafil, or Sildenafil on their medication list        Passed - Medication is active on med list        Passed - Patient is 18 years of age or older             Chasity Landis RN 09/12/22 3:16 PM  "

## 2022-09-13 RX ORDER — TAMSULOSIN HYDROCHLORIDE 0.4 MG/1
CAPSULE ORAL
Qty: 30 CAPSULE | Refills: 0 | Status: SHIPPED | OUTPATIENT
Start: 2022-09-13 | End: 2022-10-12

## 2022-10-01 DIAGNOSIS — R39.11 URINARY HESITANCY: ICD-10-CM

## 2022-10-01 RX ORDER — TAMSULOSIN HYDROCHLORIDE 0.4 MG/1
CAPSULE ORAL
Qty: 30 CAPSULE | Refills: 0 | OUTPATIENT
Start: 2022-10-01

## 2022-10-02 NOTE — TELEPHONE ENCOUNTER
Summary: TAKE 2 CAPSULES BY MOUTH EVERY DAY, Disp-30 capsule, R-0, E-Prescribe  Overdue for follow up - patient has linda contacted but has not scheduled. No further refills without visit  9/13/22

## 2022-10-03 ENCOUNTER — TELEPHONE (OUTPATIENT)
Dept: FAMILY MEDICINE | Facility: CLINIC | Age: 52
End: 2022-10-03

## 2022-10-03 NOTE — TELEPHONE ENCOUNTER
Reason for Call:  Medication or medication refill:    Do you use a [facility]?  [place] of the pharmacy and phone number for the current request:  [facility] [place]   [address] [facility]  [phone]    Name of the medication requested: He thought it was called Tamavil, having to do with urinary issues    Other : He has 8-9 days left of medication    Can we leave a detailed message on this number? YES    Phone number patient can be reached at: Cell number on file:    Telephone Information:   Mobile [phone]       Best Time: any    Call taken on 10/3/2022 at 9:26 AM by Whitley Martins

## 2022-10-03 NOTE — TELEPHONE ENCOUNTER
Attempted to contact patient to clarify request. No answer. Left message to call clinic.    Chart review indicates patient is taking tamsulosin (Flomax) 0.4 MG capsule. Is this the medication he is asking about? Please clarify with patient when he calls back

## 2022-10-12 ENCOUNTER — MYC REFILL (OUTPATIENT)
Dept: FAMILY MEDICINE | Facility: CLINIC | Age: 52
End: 2022-10-12

## 2022-10-12 DIAGNOSIS — R39.11 URINARY HESITANCY: ICD-10-CM

## 2022-10-12 RX ORDER — TAMSULOSIN HYDROCHLORIDE 0.4 MG/1
0.8 CAPSULE ORAL DAILY
Qty: 30 CAPSULE | Refills: 0 | Status: SHIPPED | OUTPATIENT
Start: 2022-10-12 | End: 2022-10-21

## 2022-10-12 NOTE — TELEPHONE ENCOUNTER
"Routing refill request to provider for review/approval because:  Patient needs to be seen because it has been more than 1 year since last office visit.    Last Written Prescription Date:  9/13/22  Last Fill Quantity: 30,  # refills: 0   Last office visit provider:  3/2/22     Requested Prescriptions   Pending Prescriptions Disp Refills     tamsulosin (FLOMAX) 0.4 MG capsule 30 capsule 0     Sig: Take 2 capsules (0.8 mg) by mouth daily       Alpha Blockers Failed - 10/12/2022  9:44 AM        Failed - Blood pressure under 140/90 in past 12 months     BP Readings from Last 3 Encounters:   05/21/21 118/82   04/30/21 124/80   03/12/20 128/82                 Failed - Recent (12 mo) or future (30 days) visit within the authorizing provider's specialty     Patient has had an office visit with the authorizing provider or a provider within the authorizing providers department within the previous 12 mos or has a future within next 30 days. See \"Patient Info\" tab in inbasket, or \"Choose Columns\" in Meds & Orders section of the refill encounter.              Passed - Patient does not have Tadalafil, Vardenafil, or Sildenafil on their medication list        Passed - Medication is active on med list        Passed - Patient is 18 years of age or older             Chasity Landis RN 10/12/22 3:50 PM  "

## 2022-10-12 NOTE — TELEPHONE ENCOUNTER
Would be out by now  HAve not seen him in person since 5/2021  At last refill request - no further refills without office visit (see below)  Has appointment with me 11/11/22 - one more refill given

## 2022-10-19 DIAGNOSIS — R39.11 URINARY HESITANCY: ICD-10-CM

## 2022-10-19 RX ORDER — TAMSULOSIN HYDROCHLORIDE 0.4 MG/1
CAPSULE ORAL
Qty: 30 CAPSULE | Refills: 0 | OUTPATIENT
Start: 2022-10-19

## 2022-10-19 NOTE — TELEPHONE ENCOUNTER
"Routing refill request to provider for review/approval because:  bp not current    Last Written Prescription Date:  10/12/22  Last Fill Quantity: 30,  # refills: 0   Last office visit provider:  3/2/22     Requested Prescriptions   Pending Prescriptions Disp Refills     tamsulosin (FLOMAX) 0.4 MG capsule [Pharmacy Med Name: TAMSULOSIN HCL 0.4 MG CAPSULE] 30 capsule 0     Sig: TAKE 2 CAPSULES BY MOUTH EVERY DAY       Alpha Blockers Failed - 10/19/2022  1:31 PM        Failed - Blood pressure under 140/90 in past 12 months     BP Readings from Last 3 Encounters:   05/21/21 118/82   04/30/21 124/80   03/12/20 128/82                 Passed - Recent (12 mo) or future (30 days) visit within the authorizing provider's specialty     Patient has had an office visit with the authorizing provider or a provider within the authorizing providers department within the previous 12 mos or has a future within next 30 days. See \"Patient Info\" tab in inbasket, or \"Choose Columns\" in Meds & Orders section of the refill encounter.              Passed - Patient does not have Tadalafil, Vardenafil, or Sildenafil on their medication list        Passed - Medication is active on med list        Passed - Patient is 18 years of age or older             Chasity Landis RN 10/19/22 4:23 PM  "

## 2022-10-20 NOTE — TELEPHONE ENCOUNTER
Already addressed:     Disp Refills Start End JESSE   tamsulosin (FLOMAX) 0.4 MG capsule 30 capsule 0 10/12/2022  No   Sig - Route: Take 2 capsules (0.8 mg) by mouth daily - Oral   Sent to pharmacy as: Tamsulosin HCl 0.4 MG Oral Capsule (FLOMAX)   Class: E-Prescribe   Order: [id number]   E-Prescribing Status: Receipt confirmed by pharmacy (10/12/2022  3:59 PM CDT)       BP Readings from Last 6 Encounters:   05/21/21 118/82   04/30/21 124/80   03/12/20 128/82   03/03/20 126/80   07/20/13 110/74   12/22/09 122/90     Has appointment [facility] on 11/11/22

## 2022-11-05 DIAGNOSIS — R39.11 URINARY HESITANCY: ICD-10-CM

## 2022-11-07 NOTE — TELEPHONE ENCOUNTER
"Routing refill request to provider for review/approval because:  bp out of range    Last Written Prescription Date:  10/21/22  Last Fill Quantity: 30,  # refills: 0   Last office visit provider:  3/2/22     Requested Prescriptions   Pending Prescriptions Disp Refills     tamsulosin (FLOMAX) 0.4 MG capsule [Pharmacy Med Name: TAMSULOSIN HCL 0.4 MG CAPSULE] 30 capsule 0     Sig: TAKE 2 CAPSULES BY MOUTH EVERY DAY       Alpha Blockers Failed - 11/5/2022  1:30 PM        Failed - Blood pressure under 140/90 in past 12 months     BP Readings from Last 3 Encounters:   05/21/21 118/82   04/30/21 124/80   03/12/20 128/82                 Passed - Recent (12 mo) or future (30 days) visit within the authorizing provider's specialty     Patient has had an office visit with the authorizing provider or a provider within the authorizing providers department within the previous 12 mos or has a future within next 30 days. See \"Patient Info\" tab in inbasket, or \"Choose Columns\" in Meds & Orders section of the refill encounter.              Passed - Patient does not have Tadalafil, Vardenafil, or Sildenafil on their medication list        Passed - Medication is active on med list        Passed - Patient is 18 years of age or older             [place], Chasity, RN 11/07/22 12:35 PM  "

## 2022-11-08 RX ORDER — TAMSULOSIN HYDROCHLORIDE 0.4 MG/1
CAPSULE ORAL
Qty: 30 CAPSULE | Refills: 0 | Status: SHIPPED | OUTPATIENT
Start: 2022-11-08 | End: 2022-11-11

## 2022-11-11 ENCOUNTER — OFFICE VISIT (OUTPATIENT)
Dept: FAMILY MEDICINE | Facility: CLINIC | Age: 52
End: 2022-11-11
Payer: COMMERCIAL

## 2022-11-11 VITALS
RESPIRATION RATE: 18 BRPM | WEIGHT: 242 LBS | DIASTOLIC BLOOD PRESSURE: 72 MMHG | HEIGHT: 71 IN | OXYGEN SATURATION: 99 % | TEMPERATURE: 97.8 F | BODY MASS INDEX: 33.88 KG/M2 | SYSTOLIC BLOOD PRESSURE: 122 MMHG | HEART RATE: 65 BPM

## 2022-11-11 DIAGNOSIS — Z00.00 ROUTINE GENERAL MEDICAL EXAMINATION AT A HEALTH CARE FACILITY: Primary | ICD-10-CM

## 2022-11-11 DIAGNOSIS — R39.11 URINARY HESITANCY: ICD-10-CM

## 2022-11-11 DIAGNOSIS — Z13.220 SCREENING, LIPID: ICD-10-CM

## 2022-11-11 DIAGNOSIS — Z12.5 SCREENING FOR PROSTATE CANCER: ICD-10-CM

## 2022-11-11 DIAGNOSIS — Z13.1 SCREENING FOR DIABETES MELLITUS: ICD-10-CM

## 2022-11-11 DIAGNOSIS — R35.0 URINARY FREQUENCY: ICD-10-CM

## 2022-11-11 LAB
ALBUMIN UR-MCNC: NEGATIVE MG/DL
APPEARANCE UR: CLEAR
BILIRUB UR QL STRIP: NEGATIVE
CHOLEST SERPL-MCNC: 233 MG/DL
COLOR UR AUTO: YELLOW
FASTING STATUS PATIENT QL REPORTED: NORMAL
GLUCOSE SERPL-MCNC: 97 MG/DL (ref 70–99)
GLUCOSE UR STRIP-MCNC: NEGATIVE MG/DL
HDLC SERPL-MCNC: 47 MG/DL
HGB UR QL STRIP: NEGATIVE
KETONES UR STRIP-MCNC: NEGATIVE MG/DL
LDLC SERPL CALC-MCNC: 153 MG/DL
LEUKOCYTE ESTERASE UR QL STRIP: NEGATIVE
NITRATE UR QL: NEGATIVE
NONHDLC SERPL-MCNC: 186 MG/DL
PH UR STRIP: 7 [PH] (ref 5–8)
PSA SERPL-MCNC: 2.58 NG/ML (ref 0–3.5)
SP GR UR STRIP: 1.02 (ref 1–1.03)
TRIGL SERPL-MCNC: 166 MG/DL
UROBILINOGEN UR STRIP-ACNC: 0.2 E.U./DL

## 2022-11-11 PROCEDURE — 80061 LIPID PANEL: CPT | Performed by: FAMILY MEDICINE

## 2022-11-11 PROCEDURE — 90682 RIV4 VACC RECOMBINANT DNA IM: CPT | Performed by: FAMILY MEDICINE

## 2022-11-11 PROCEDURE — 36415 COLL VENOUS BLD VENIPUNCTURE: CPT | Performed by: FAMILY MEDICINE

## 2022-11-11 PROCEDURE — 82947 ASSAY GLUCOSE BLOOD QUANT: CPT | Performed by: FAMILY MEDICINE

## 2022-11-11 PROCEDURE — 99396 PREV VISIT EST AGE 40-64: CPT | Mod: 25 | Performed by: FAMILY MEDICINE

## 2022-11-11 PROCEDURE — 99213 OFFICE O/P EST LOW 20 MIN: CPT | Mod: 25 | Performed by: FAMILY MEDICINE

## 2022-11-11 PROCEDURE — G0103 PSA SCREENING: HCPCS | Performed by: FAMILY MEDICINE

## 2022-11-11 PROCEDURE — 90471 IMMUNIZATION ADMIN: CPT | Performed by: FAMILY MEDICINE

## 2022-11-11 PROCEDURE — 81003 URINALYSIS AUTO W/O SCOPE: CPT | Performed by: FAMILY MEDICINE

## 2022-11-11 PROCEDURE — 91313 COVID-19,PF,MODERNA BIVALENT: CPT | Performed by: FAMILY MEDICINE

## 2022-11-11 PROCEDURE — 0134A COVID-19,PF,MODERNA BIVALENT: CPT | Performed by: FAMILY MEDICINE

## 2022-11-11 RX ORDER — TAMSULOSIN HYDROCHLORIDE 0.4 MG/1
0.8 CAPSULE ORAL DAILY
Qty: 180 CAPSULE | Refills: 0 | Status: SHIPPED | OUTPATIENT
Start: 2022-11-11 | End: 2023-02-03

## 2022-11-11 ASSESSMENT — ENCOUNTER SYMPTOMS
DIZZINESS: 1
CHILLS: 0
DYSURIA: 0
HEMATOCHEZIA: 0
MYALGIAS: 0
NAUSEA: 0
HEADACHES: 0
PALPITATIONS: 0
DIARRHEA: 0
ABDOMINAL PAIN: 0
JOINT SWELLING: 0
COUGH: 0
HEARTBURN: 0
HEMATURIA: 0
FEVER: 0
PARESTHESIAS: 0
SORE THROAT: 0
WEAKNESS: 0
EYE PAIN: 0
SHORTNESS OF BREATH: 0
ARTHRALGIAS: 1
FREQUENCY: 1
CONSTIPATION: 0
NERVOUS/ANXIOUS: 0

## 2022-11-11 NOTE — PROGRESS NOTES
ASSESSMENT/PLAN:   Go was seen today for physical and recheck medication.    Diagnoses and all orders for this visit:    Routine general medical examination at a health care facility    Urinary frequency / Urinary hesitancy  Re-Evaluate with   -     UA Macro with Reflex to Micro and Culture - lab collect - normal  -     PSA, screen - normal  Refill:  -     tamsulosin (FLOMAX) 0.4 MG capsule; Take 2 capsules (0.8 mg) by mouth daily  Refer:  -     Adult Urology  Referral; Future    Screening, lipid  -     Lipid panel reflex to direct LDL Fasting    Screening for diabetes mellitus  -     Glucose    Screening for prostate cancer  -     PSA, screen    Other orders  -     ZOSTER VACCINE RECOMBINANT ADJUVANTED (SHINGRIX); Standing  -     COVID-19,PF,MODERNA BIVALENT 18+Yrs  -     INFLUENZA QUAD, PF (RIV4) (FLUBLOK)        Patient has been advised of split billing requirements and indicates understanding: Yes      COUNSELING:   Reviewed preventive health counseling, as reflected in patient instructions  Special attention given to:        Ravi taping after stubbed toe - he felt immediately better when I did this      He reports that he quit smoking about 24 years ago. His smoking use included cigarettes. He has never used smokeless tobacco.      Zaida Tian MD  [facility]    SUBJECTIVE:   CC: Go is an 52 year old who presents for preventative health visit.     Patient has been advised of split billing requirements and indicates understanding: Yes  Healthy Habits:     Getting at least 3 servings of Calcium per day:  Yes    Bi-annual eye exam:  Yes    Dental care twice a year:  NO    Sleep apnea or symptoms of sleep apnea:  None    Diet:  Carbohydrate counting    Frequency of exercise:  2-3 days/week    Duration of exercise:  15-30 minutes    Taking medications regularly:  Yes    Medication side effects:  None    PHQ-2 Total Score: 0    Additional concerns today:  No    I  "last saw Go on 5/21/2021.  At that visit he noted some aching in his hands and along with his diagnosis of psoriasis wondered if he could have psoriatic arthritis.  He did see rheumatologist Dr. Lozada - they  considered treatment but then his arthritis and his energy got better on its own.    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  She started seeing a therapist and this along with doing more meditation, talking more with his wife has been helpful.    New and ongoing concerns:    Right pinky toe pain - at first thought gout as it was a little pink - Going on for the last few weeks - this week not bad- can walk   - No new shoes, does have old shoes - has a new pair on the way   - No injury - maybe a stub - remembers stubbing but doens't remember when     Difficulty urinating/urinary frequency    Has had frequently urination all his life - \"my Dad would yell at me because I would pee so much\"      - tamsulosin worked at first - then we upped to two pills - its still better than not taking it, but is not as effective as he had hoped   - if he doesn't take tamsulosin - the feeling of having to pee is more intense and more frequent.    - only possible  side effect is sometimes he gets a little dizzy - walking - then stops and is fine - infrequently.  He also does 15 hour fast 8 pm to noon   - still pees 20  per day sometimes - tries to ignore the urge.  - sometimes doens't need to go for 1.5 hours, then other time 4x in a row   - feel like a seal break - pushes harder then gets a gush    Edema - slight swelling above sock line noted by wife      Today's PHQ-2 Score:   PHQ-2 ( 1999 [employer]) 11/11/2022   Q1: Little interest or pleasure in doing things 0   Q2: Feeling down, depressed or hopeless 0   PHQ-2 Score 0   Q1: Little interest or pleasure in doing things Not at all   Q2: " Feeling down, depressed or hopeless Not at all   PHQ-2 Score 0       Abuse: Current or Past(Physical, Sexual or Emotional)- No  Do you feel safe in your environment? Yes    Have you ever done Advance Care Planning? (For example, a Health Directive, POLST, or a discussion with a medical provider or your loved ones about your wishes): Yes, patient states has an Advance Care Planning document and will bring a copy to the clinic.    Social History     Tobacco Use     Smoking status: Former     Types: Cigarettes     Quit date: 1998     Years since quittin.3     Smokeless tobacco: Never   Substance Use Topics     Alcohol use: Not on file         Alcohol Use 2022   Prescreen: >3 drinks/day or >7 drinks/week? No       Last PSA:   Prostate Specific Antigen Screen   Date Value Ref Range Status   2021 1.7 0.0 - 3.5 ng/mL Final       Reviewed orders with patient. Reviewed health maintenance and updated orders accordingly - Yes      Reviewed and updated as needed this visit by clinical staff   Tobacco  Allergies  Meds              Reviewed and updated as needed this visit by Provider      Review of Systems   Constitutional: Negative for chills and fever.   HENT: Negative for congestion, ear pain, hearing loss and sore throat.    Eyes: Negative for pain and visual disturbance.   Respiratory: Negative for cough and shortness of breath.    Cardiovascular: Positive for peripheral edema. Negative for chest pain and palpitations.   Gastrointestinal: Negative for abdominal pain, constipation, diarrhea, heartburn, hematochezia and nausea.   Genitourinary: Positive for frequency and urgency. Negative for dysuria, genital sores, hematuria, impotence and penile discharge.   Musculoskeletal: Positive for arthralgias. Negative for joint swelling and myalgias.   Skin: Negative for rash.   Neurological: Positive for dizziness. Negative for weakness, headaches and paresthesias.   Psychiatric/Behavioral: Negative for  "mood changes. The patient is not nervous/anxious.        OBJECTIVE:   /72 (BP Location: Left arm, Patient Position: Sitting, Cuff Size: Adult Large)   Pulse 65   Temp 97.8  F (36.6  C) (Oral)   Resp 18   Ht 1.803 m (5' 11\")   Wt 109.8 kg (242 lb)   SpO2 99%   BMI 33.75 kg/m      Physical Exam  General appearance - alert, well appearing, and in no distress  Mental status - normal mood, behavior, speech, dress, motor activity, and thought processes  Eyes - pupils equal and reactive, extraocular eye movements intact, funduscopic exam normal, discs flat and sharp  Ears - bilateral TM's and external ear canals normal  Mouth - mucous membranes moist, pharynx normal without lesions  Neck - supple, no significant adenopathy, carotids upstroke normal bilaterally, no bruits, thyroid exam: thyroid is normal in size without nodules or tenderness  Chest - clear to auscultation, no wheezes, rales or rhonchi, symmetric air entry  Heart - normal rate, regular rhythm, normal S1, S2, no murmurs, rubs, clicks or gallops  Abdomen - soft, nontender, nondistended, no masses or organomegaly  Neurological - alert, oriented, normal speech, no focal findings or movement disorder noted, DTR's normal and symmetric  Extremities - peripheral pulses normal, trace, non-pitting no pedal edema; no clubbing or cyanosis; can't palpate peripheral pulses, but no stigmata of PAD and he denies calf pain with walking. Slight pin to palpation right 5th MTP  join, but not pain with passive range of motion   Skin - no worrisome lesions        "

## 2023-02-03 ENCOUNTER — OFFICE VISIT (OUTPATIENT)
Dept: UROLOGY | Facility: CLINIC | Age: 53
End: 2023-02-03
Attending: FAMILY MEDICINE
Payer: COMMERCIAL

## 2023-02-03 VITALS
BODY MASS INDEX: 33.6 KG/M2 | HEIGHT: 71 IN | DIASTOLIC BLOOD PRESSURE: 76 MMHG | WEIGHT: 240 LBS | SYSTOLIC BLOOD PRESSURE: 128 MMHG

## 2023-02-03 DIAGNOSIS — R39.14 FEELING OF INCOMPLETE BLADDER EMPTYING: ICD-10-CM

## 2023-02-03 DIAGNOSIS — R39.11 URINARY HESITANCY: ICD-10-CM

## 2023-02-03 DIAGNOSIS — R35.0 URINARY FREQUENCY: Primary | ICD-10-CM

## 2023-02-03 DIAGNOSIS — R39.11 BENIGN PROSTATIC HYPERPLASIA WITH URINARY HESITANCY: ICD-10-CM

## 2023-02-03 DIAGNOSIS — N40.1 BENIGN PROSTATIC HYPERPLASIA WITH URINARY HESITANCY: ICD-10-CM

## 2023-02-03 LAB
ALBUMIN UR-MCNC: NEGATIVE MG/DL
APPEARANCE UR: CLEAR
BILIRUB UR QL STRIP: NEGATIVE
COLOR UR AUTO: YELLOW
GLUCOSE UR STRIP-MCNC: NEGATIVE MG/DL
HGB UR QL STRIP: NEGATIVE
KETONES UR STRIP-MCNC: NEGATIVE MG/DL
LEUKOCYTE ESTERASE UR QL STRIP: NEGATIVE
NITRATE UR QL: NEGATIVE
PH UR STRIP: 7 [PH] (ref 5–7)
RESIDUAL VOLUME (RV) (EXTERNAL): 110
SP GR UR STRIP: 1.02 (ref 1–1.03)
UROBILINOGEN UR STRIP-ACNC: 1 E.U./DL

## 2023-02-03 PROCEDURE — 99203 OFFICE O/P NEW LOW 30 MIN: CPT | Mod: 25 | Performed by: PHYSICIAN ASSISTANT

## 2023-02-03 PROCEDURE — 81003 URINALYSIS AUTO W/O SCOPE: CPT | Mod: QW | Performed by: PHYSICIAN ASSISTANT

## 2023-02-03 PROCEDURE — 51798 US URINE CAPACITY MEASURE: CPT | Performed by: PHYSICIAN ASSISTANT

## 2023-02-03 RX ORDER — TAMSULOSIN HYDROCHLORIDE 0.4 MG/1
0.8 CAPSULE ORAL DAILY
Qty: 180 CAPSULE | Refills: 3 | Status: SHIPPED | OUTPATIENT
Start: 2023-02-03 | End: 2023-09-19

## 2023-02-03 ASSESSMENT — ENCOUNTER SYMPTOMS
FEVER: 0
CHILLS: 0
FREQUENCY: 1
SHORTNESS OF BREATH: 0
NAUSEA: 0
DYSURIA: 0
HEMATURIA: 0
DIFFICULTY URINATING: 1
CONSTIPATION: 0
VOMITING: 0
DIARRHEA: 0

## 2023-02-03 ASSESSMENT — PAIN SCALES - GENERAL: PAINLEVEL: NO PAIN (0)

## 2023-02-03 NOTE — LETTER
2/3/2023       RE: Go Sen  [address]  [place] [address]     Dear Colleague,    Thank you for referring your patient, Go Sen, to the [facility]. Please see a copy of my visit note below.    Subjective       REQUESTING PROVIDER   Zaida Tian     REASON FOR CONSULT   Urinary frequency    HISTORY OF PRESENT ILLNESS   Mr. Sen is a very pleasant 53-year-old gentleman, who presents today for further evaluation recommendations regarding urinary urgency and frequency.  He notes that it has been particularly bothersome over the last several years, but he has had frequent urination for much of his life.  At times he feels like there is a valve that needs to break loose for him to be able to urinate.  Patient typically urinates approximately every 30 minutes.  Sometimes he will need to urinate 4 times in a row rather quickly.    He was started initially on Flomax 0.4 mg.  This has been increased to Flomax 0.8 mg.  He does feel like it is helping.  He can notice the difference if he forgets to take it.  He endorses intermittency of the urinary stream, hesitancy, urgency, frequency, and postvoid dribbling.  He notes that his frequency at night is not as bad.  Nocturia can range from 0-5 times.  He denies any hematuria or dysuria.  He would categorize his stream is strong.  He does not feel like he empties his bladder.    Postvoid residual today is 110 mL.  Most recent PSA is 2.58.  Urinalysis is within normal limits.    Food intake includes 2 to 3 cups of coffee and water.  He denies any difficulties with his bowel movements.    The following portions of the patient's history were reviewed and updated as appropriate: allergies, current medications, past family history, past medical history, past social history, past surgical history and problem list.     REVIEW OF SYSTEMS   Review of Systems   Constitutional:  "Negative for chills and fever.   Respiratory: Negative for shortness of breath.    Cardiovascular: Negative for chest pain.   Gastrointestinal: Negative for constipation, diarrhea, nausea and vomiting.   Genitourinary: Positive for difficulty urinating, frequency and urgency. Negative for dysuria and hematuria.      Per HPI.     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    Family History:   No known family history of  malignancy.     Objective       PHYSICAL EXAM   /76   Ht 1.803 m (5' 11\")   Wt 108.9 kg (240 lb)   BMI 33.47 kg/m     Physical Exam  Constitutional:       Appearance: Normal appearance.   HENT:      Head: Normocephalic.      Nose: Nose normal.   Eyes:      General: No scleral icterus.  Pulmonary:      Effort: Pulmonary effort is normal.   Abdominal:      Tenderness: There is no right CVA tenderness or left CVA tenderness.   Genitourinary:     Comments: Prostate: Approximately 40 g.  No palpable masses, nodules, or induration.  Rectum: Normal rectal tone.  Musculoskeletal:         General: Normal range of motion.      Cervical back: Normal range of motion.   Skin:     General: Skin is warm and dry.      Comments: Erythema and irritation of the gluteal fold.   Neurological:      General: No focal deficit present.      Mental Status: He is alert and oriented to person, place, and time.   Psychiatric:         Mood and Affect: Mood normal.         Behavior: Behavior normal.        LABORATORY   Recent Labs   Lab Test 02/03/23  1526   COLOR Yellow   APPEARANCE Clear   URINEGLC Negative "   URINEBILI Negative   URINEKETONE Negative   SG 1.020   UBLD Negative   URINEPH 7.0   PROTEIN Negative   UROBILINOGEN 1.0   NITRITE Negative   LEUKEST Negative     Lab Results   Component Value Date    PSA 2.58 11/11/2022    PSA 1.7 05/21/2021      TESTING    PVR: 110 mL    Assessment & Plan    1. Urinary frequency    2. Urinary hesitancy    3. Feeling of incomplete bladder emptying    4. Benign prostatic hyperplasia with urinary hesitancy        I had the pleasure today of meeting with Mr. Sen to discuss his urinary symptomatology.  He has had longstanding issues with frequency, but his symptoms have worsened.  We discussed that he has had an age where BPH is likely adding into his previous urgency and frequency.  We discussed that his symptoms could be due to BPH, urethral stricture, overactivity of the bladder, or combination of these items.      He is already on an alpha-blocker.  We also discussed that if this were to be the prostate, another medication that we could consider would be a 5 alpha reductase inhibitor such as finasteride.  These medications do take quite a while to work.  Additionally, he does not empty his bladder particularly well for 53-year-old.  His postvoid residual today was 110 mL.  Most recent PSA is 2.58.    -Will plan on cystoscopy to evaluate the degree of obstruction from the prostate, possible stricture, and appearance of the bladder.    -If BPH, could consider adding on finasteride to shrink the prostate.  This will take about 6-9 months to see full effect.  The other option would be possible surgery.    -Strictures are typically treated surgically or with dilation.    -If it appear like bladder overactivity, would consider PT or overactive bladder medication.    -Continue with Flomax 0.8 mg daily for now.    Contact us in the interim with questions, concerns, or changes in symptomatology.      Signed by:     Fabiana Louis PA-C 2/3/2023 3:41 PM

## 2023-02-03 NOTE — NURSING NOTE
Chief Complaint   Patient presents with     Urinary Frequency     Feeling of incomplete bladder emptying     Pt states he is urinating about every half hour, not as bothersome at nighttime. Pt states he feels he is not emptying    PVR: 110 mL    Rosa Hall, CMA

## 2023-02-03 NOTE — PROGRESS NOTES
Subjective      REQUESTING PROVIDER   Zaida Tian     REASON FOR CONSULT   Urinary frequency    HISTORY OF PRESENT ILLNESS   Mr. Sen is a very pleasant 53-year-old gentleman, who presents today for further evaluation recommendations regarding urinary urgency and frequency.  He notes that it has been particularly bothersome over the last several years, but he has had frequent urination for much of his life.  At times he feels like there is a valve that needs to break loose for him to be able to urinate.  Patient typically urinates approximately every 30 minutes.  Sometimes he will need to urinate 4 times in a row rather quickly.    He was started initially on Flomax 0.4 mg.  This has been increased to Flomax 0.8 mg.  He does feel like it is helping.  He can notice the difference if he forgets to take it.  He endorses intermittency of the urinary stream, hesitancy, urgency, frequency, and postvoid dribbling.  He notes that his frequency at night is not as bad.  Nocturia can range from 0-5 times.  He denies any hematuria or dysuria.  He would categorize his stream is strong.  He does not feel like he empties his bladder.    Postvoid residual today is 110 mL.  Most recent PSA is 2.58.  Urinalysis is within normal limits.    Food intake includes 2 to 3 cups of coffee and water.  He denies any difficulties with his bowel movements.    The following portions of the patient's history were reviewed and updated as appropriate: allergies, current medications, past family history, past medical history, past social history, past surgical history and problem list.     REVIEW OF SYSTEMS   Review of Systems   Constitutional: Negative for chills and fever.   Respiratory: Negative for shortness of breath.    Cardiovascular: Negative for chest pain.   Gastrointestinal: Negative for constipation, diarrhea, nausea and vomiting.   Genitourinary: Positive for difficulty urinating, frequency and urgency. Negative for dysuria and  "hematuria.      Per HPI.     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    Family History:   No known family history of  malignancy.     Objective      PHYSICAL EXAM   /76   Ht 1.803 m (5' 11\")   Wt 108.9 kg (240 lb)   BMI 33.47 kg/m     Physical Exam  Constitutional:       Appearance: Normal appearance.   HENT:      Head: Normocephalic.      Nose: Nose normal.   Eyes:      General: No scleral icterus.  Pulmonary:      Effort: Pulmonary effort is normal.   Abdominal:      Tenderness: There is no right CVA tenderness or left CVA tenderness.   Genitourinary:     Comments: Prostate: Approximately 40 g.  No palpable masses, nodules, or induration.  Rectum: Normal rectal tone.  Musculoskeletal:         General: Normal range of motion.      Cervical back: Normal range of motion.   Skin:     General: Skin is warm and dry.      Comments: Erythema and irritation of the gluteal fold.   Neurological:      General: No focal deficit present.      Mental Status: He is alert and oriented to person, place, and time.   Psychiatric:         Mood and Affect: Mood normal.         Behavior: Behavior normal.        LABORATORY   Recent Labs   Lab Test 02/03/23  1526   COLOR Yellow   APPEARANCE Clear   URINEGLC Negative   URINEBILI Negative   URINEKETONE Negative   SG 1.020   UBLD Negative   URINEPH 7.0   PROTEIN Negative   UROBILINOGEN 1.0   NITRITE Negative   LEUKEST Negative     Lab Results   Component Value Date    PSA 2.58 11/11/2022    PSA 1.7 05/21/2021      TESTING    PVR: 110 mL    Assessment & Plan    1. Urinary " frequency    2. Urinary hesitancy    3. Feeling of incomplete bladder emptying    4. Benign prostatic hyperplasia with urinary hesitancy        I had the pleasure today of meeting with Mr. Sen to discuss his urinary symptomatology.  He has had longstanding issues with frequency, but his symptoms have worsened.  We discussed that he has had an age where BPH is likely adding into his previous urgency and frequency.  We discussed that his symptoms could be due to BPH, urethral stricture, overactivity of the bladder, or combination of these items.      He is already on an alpha-blocker.  We also discussed that if this were to be the prostate, another medication that we could consider would be a 5 alpha reductase inhibitor such as finasteride.  These medications do take quite a while to work.  Additionally, he does not empty his bladder particularly well for 53-year-old.  His postvoid residual today was 110 mL.  Most recent PSA is 2.58.    -Will plan on cystoscopy to evaluate the degree of obstruction from the prostate, possible stricture, and appearance of the bladder.    -If BPH, could consider adding on finasteride to shrink the prostate.  This will take about 6-9 months to see full effect.  The other option would be possible surgery.    -Strictures are typically treated surgically or with dilation.    -If it appear like bladder overactivity, would consider PT or overactive bladder medication.    -Continue with Flomax 0.8 mg daily for now.    Contact us in the interim with questions, concerns, or changes in symptomatology.      Signed by:     Fabiana Louis PA-C 2/3/2023 3:41 PM

## 2023-02-03 NOTE — PATIENT INSTRUCTIONS
Patient information on fall and injury prevention Will plan on cystoscopy to evaluate the degree of obstruction from the prostate, possible stricture, and appearance of the bladder.    If BPH, could consider adding on finasteride to shrink the prostate.  This will take about 6-9 months to see full effect.  The other option would be possible surgery.    Strictures are typically treated surgically or with dilation.    If it appear like bladder overactivity, would consider PT or overactive bladder medication.    Continue with Flomax 0.8 mg daily for now.    Contact us in the interim with questions, concerns, or changes in symptomatology.  [phone]

## 2023-03-10 ENCOUNTER — OFFICE VISIT (OUTPATIENT)
Dept: UROLOGY | Facility: CLINIC | Age: 53
End: 2023-03-10
Payer: COMMERCIAL

## 2023-03-10 VITALS
BODY MASS INDEX: 33.32 KG/M2 | SYSTOLIC BLOOD PRESSURE: 124 MMHG | DIASTOLIC BLOOD PRESSURE: 74 MMHG | WEIGHT: 238 LBS | HEIGHT: 71 IN

## 2023-03-10 DIAGNOSIS — R35.0 BENIGN PROSTATIC HYPERPLASIA WITH URINARY FREQUENCY: Primary | ICD-10-CM

## 2023-03-10 DIAGNOSIS — N40.1 BENIGN PROSTATIC HYPERPLASIA WITH URINARY FREQUENCY: Primary | ICD-10-CM

## 2023-03-10 LAB
ALBUMIN UR-MCNC: NEGATIVE MG/DL
APPEARANCE UR: CLEAR
BILIRUB UR QL STRIP: NEGATIVE
COLOR UR AUTO: YELLOW
GLUCOSE UR STRIP-MCNC: NEGATIVE MG/DL
HGB UR QL STRIP: NEGATIVE
KETONES UR STRIP-MCNC: NEGATIVE MG/DL
LEUKOCYTE ESTERASE UR QL STRIP: NEGATIVE
NITRATE UR QL: NEGATIVE
PH UR STRIP: 6 [PH] (ref 5–7)
SP GR UR STRIP: 1.01 (ref 1–1.03)
UROBILINOGEN UR STRIP-ACNC: 0.2 E.U./DL

## 2023-03-10 PROCEDURE — 52000 CYSTOURETHROSCOPY: CPT | Performed by: STUDENT IN AN ORGANIZED HEALTH CARE EDUCATION/TRAINING PROGRAM

## 2023-03-10 PROCEDURE — 81003 URINALYSIS AUTO W/O SCOPE: CPT | Mod: QW | Performed by: STUDENT IN AN ORGANIZED HEALTH CARE EDUCATION/TRAINING PROGRAM

## 2023-03-10 PROCEDURE — 99213 OFFICE O/P EST LOW 20 MIN: CPT | Mod: 25 | Performed by: STUDENT IN AN ORGANIZED HEALTH CARE EDUCATION/TRAINING PROGRAM

## 2023-03-10 RX ORDER — OXYCODONE HYDROCHLORIDE 5 MG/1
5 TABLET ORAL ONCE
Qty: 1 TABLET | Refills: 0 | Status: SHIPPED | OUTPATIENT
Start: 2023-03-10 | End: 2023-03-10

## 2023-03-10 RX ORDER — LIDOCAINE HYDROCHLORIDE 20 MG/ML
JELLY TOPICAL ONCE
Status: COMPLETED | OUTPATIENT
Start: 2023-03-10 | End: 2023-03-10

## 2023-03-10 RX ORDER — DIAZEPAM 5 MG
5 TABLET ORAL ONCE
Qty: 1 TABLET | Refills: 0 | Status: SHIPPED | OUTPATIENT
Start: 2023-03-10 | End: 2023-03-10

## 2023-03-10 RX ADMIN — LIDOCAINE HYDROCHLORIDE 5 ML: 20 JELLY TOPICAL at 14:07

## 2023-03-10 ASSESSMENT — PAIN SCALES - GENERAL: PAINLEVEL: NO PAIN (0)

## 2023-03-10 NOTE — NURSING NOTE
Chief Complaint   Patient presents with     Benign Prostatic Hypertrophy     Urinary Frequency     Prior to the start of the procedure and with procedural staff participation, I verbally confirmed the patient s identity using two indicators, relevant allergies, that the procedure was appropriate and matched the consent or emergent situation, and that the correct equipment/implants were available. Immediately prior to starting the procedure I conducted the Time Out with the procedural staff and re-confirmed the patient s name, procedure, and site/side. I have wiped the patient off with the povidone-Iodine solution, draped them, and used Lidocaine hydrochloride jelly. (The Joint Commission universal protocol was followed.)  Yes    Sedation (Moderate or Deep): None    5mL 2% lidocaine hydrochloride Urojet instilled into urethra.    NDC# [address]-3  Lot #: [id number]  Expiration Date:  09/24    Rosa Hall CMA

## 2023-03-10 NOTE — PATIENT INSTRUCTIONS

## 2023-03-10 NOTE — LETTER
"3/10/2023       RE: Go Sen  [address]  [place] [address]     Dear Colleague,    Thank you for referring your patient, Go Sen, to the [facility] [place] at [facility]. Please see a copy of my visit note below.    CHIEF COMPLAINT   Go Sen who is a 53 year old male returns today for follow-up of LUTS including urinary urgency and frequency    HPI   Go Sen is a 53 year old male returns today for follow-up of LUTS including urinary urgency and frequency    Saw Fabiana Louis PA-C 2/3/2023. Has been on long term max dose flomax without much improvement. PVR at that time was 110 ml. PSA was within normal range. Here for cysto, discussion of next steps    PHYSICAL EXAM  Patient is a 53 year old  male   Vitals: Blood pressure 124/74, height 1.803 m (5' 11\"), weight 108 kg (238 lb).  Body mass index is 33.19 kg/m .  General Appearance Adult:   Alert, no acute distress, oriented  HENT: throat/mouth:normal, good dentition  Lungs: no respiratory distress, or pursed lip breathing  Heart: No obvious jugular venous distension present  Abdomen: nondistended  Musculoskeltal: extremities normal, no peripheral edema  Skin: no suspicious lesions or rashes  Neuro: Alert, oriented, speech and mentation normal  Psych: affect and mood normal  Gait: Normal  : 45 gram benign prostate    All pertinent imaging reviewed:    PRE-PROCEDURE DIAGNOSIS: urinary urgency and frequency    POST-PROCEDURE DIAGNOSIS: BPH with urinary urgency and frequency    PROCEDURE: Cystoscopy     DESCRIPTION OF PROCEDURE: After informed consent was obtained, the patient was brought to the procedure room where he was placed in the supine position with all pressure points well padded.  The penis was prepped and draped in sterile fashion. A flexible cystoscope was introduced through a well-lubricated urethra.     Normal anterior urethra  Prostatic urethra " about 3.5 cm with bilobar obstructive hypertrophy, no significant median lobe but intravesical circumferential intravesical protrusion  Mild bladder trabeculation without cellules or diverticulae  No bladder stones  No concerning urothelial lesions    The flexible cystoscope was removed and the findings were described to the patient.         ASSESSMENT and PLAN  53 year old male returns today for follow-up of BPH with LUTS including urinary urgency and frequency, bothersome symptoms despite max dose alpha blocker. Obstructive prostate on cysto with sequlae of obstruction including bladder trabeculation. Discussed max medical therapy with addition of 5ARI but I do not recommend this in young men d/t risk of worsening sexual function. Thus I offered bladder outlet procedure, minimally invasive surgical therapies preferred in this patient. Discussed Rezum in particular (cystoscopy with water vapor thermal therapy). For Rezum discussed expected 3-5 day post procedure catheterization. Most patient see symptom improvement in two weeks but can take up to three months for maximum benefit. Potential risks of Rezum include but are not limited to painful urination (dysuria), blood in the urine (hematuria), blood in the semen (hematospermia), decrease in ejaculatory volume, urinary tract infection (UTI), and urinary frequency, retention or urgency. I also briefly discussed Greenlight laser photovaporization of the prostate but this has significantly more risk and I think he would be best served with a trial of Rezum first    Schedule Rezum  Office based procedure with oxycodone and valium periprocedurally, as well as transrectal ultrasound guided lidocaine prostate block      Carlo Rosas MD   [facility]  [facility] Phone: [phone]

## 2023-03-10 NOTE — PROGRESS NOTES
"CHIEF COMPLAINT   Go Sen who is a 53 year old male returns today for follow-up of LUTS including urinary urgency and frequency    HPI   Go Sen is a 53 year old male returns today for follow-up of LUTS including urinary urgency and frequency    Saw Fabiana Louis PA-C 2/3/2023. Has been on long term max dose flomax without much improvement. PVR at that time was 110 ml. PSA was within normal range. Here for cysto, discussion of next steps    PHYSICAL EXAM  Patient is a 53 year old  male   Vitals: Blood pressure 124/74, height 1.803 m (5' 11\"), weight 108 kg (238 lb).  Body mass index is 33.19 kg/m .  General Appearance Adult:   Alert, no acute distress, oriented  HENT: throat/mouth:normal, good dentition  Lungs: no respiratory distress, or pursed lip breathing  Heart: No obvious jugular venous distension present  Abdomen: nondistended  Musculoskeltal: extremities normal, no peripheral edema  Skin: no suspicious lesions or rashes  Neuro: Alert, oriented, speech and mentation normal  Psych: affect and mood normal  Gait: Normal  : 45 gram benign prostate    All pertinent imaging reviewed:    PRE-PROCEDURE DIAGNOSIS: urinary urgency and frequency    POST-PROCEDURE DIAGNOSIS: BPH with urinary urgency and frequency    PROCEDURE: Cystoscopy     DESCRIPTION OF PROCEDURE: After informed consent was obtained, the patient was brought to the procedure room where he was placed in the supine position with all pressure points well padded.  The penis was prepped and draped in sterile fashion. A flexible cystoscope was introduced through a well-lubricated urethra.     Normal anterior urethra  Prostatic urethra about 3.5 cm with bilobar obstructive hypertrophy, no significant median lobe but intravesical circumferential intravesical protrusion  Mild bladder trabeculation without cellules or diverticulae  No bladder stones  No concerning urothelial lesions    The flexible cystoscope was removed and the findings were " described to the patient.         ASSESSMENT and PLAN  53 year old male returns today for follow-up of BPH with LUTS including urinary urgency and frequency, bothersome symptoms despite max dose alpha blocker. Obstructive prostate on cysto with sequlae of obstruction including bladder trabeculation. Discussed max medical therapy with addition of 5ARI but I do not recommend this in young men d/t risk of worsening sexual function. Thus I offered bladder outlet procedure, minimally invasive surgical therapies preferred in this patient. Discussed Rezum in particular (cystoscopy with water vapor thermal therapy). For Rezum discussed expected 3-5 day post procedure catheterization. Most patient see symptom improvement in two weeks but can take up to three months for maximum benefit. Potential risks of Rezum include but are not limited to painful urination (dysuria), blood in the urine (hematuria), blood in the semen (hematospermia), decrease in ejaculatory volume, urinary tract infection (UTI), and urinary frequency, retention or urgency. I also briefly discussed Greenlight laser photovaporization of the prostate but this has significantly more risk and I think he would be best served with a trial of Rezum first    Schedule Rezum  Office based procedure with oxycodone and valium periprocedurally, as well as transrectal ultrasound guided lidocaine prostate block      Carlo Rosas MD   [facility]  [facility] Phone: [phone]

## 2023-03-13 ENCOUNTER — MYC MEDICAL ADVICE (OUTPATIENT)
Dept: FAMILY MEDICINE | Facility: CLINIC | Age: 53
End: 2023-03-13
Payer: COMMERCIAL

## 2023-10-13 ENCOUNTER — PATIENT OUTREACH (OUTPATIENT)
Dept: CARE COORDINATION | Facility: CLINIC | Age: 53
End: 2023-10-13
Payer: COMMERCIAL

## 2023-10-27 ENCOUNTER — PATIENT OUTREACH (OUTPATIENT)
Dept: CARE COORDINATION | Facility: CLINIC | Age: 53
End: 2023-10-27
Payer: COMMERCIAL

## 2023-12-20 ENCOUNTER — TELEPHONE (OUTPATIENT)
Dept: UROLOGY | Facility: CLINIC | Age: 53
End: 2023-12-20
Payer: COMMERCIAL

## 2023-12-20 NOTE — TELEPHONE ENCOUNTER
Pt called on 9/11/23 to schedule the Rezum procedure with Dr Rosas but we did not have a date to offer due to staffing issues.  Called pt today to schedule the Rezum procedure and left pt a voicemail.  Sent pt a [facility] msg as well.

## 2024-01-27 ENCOUNTER — HEALTH MAINTENANCE LETTER (OUTPATIENT)
Age: 54
End: 2024-01-27

## 2024-02-23 ENCOUNTER — TELEPHONE (OUTPATIENT)
Facility: CLINIC | Age: 54
End: 2024-02-23
Payer: COMMERCIAL

## 2024-04-03 ENCOUNTER — MYC REFILL (OUTPATIENT)
Dept: UROLOGY | Facility: CLINIC | Age: 54
End: 2024-04-03
Payer: COMMERCIAL

## 2024-04-03 DIAGNOSIS — R39.11 URINARY HESITANCY: ICD-10-CM

## 2024-04-03 DIAGNOSIS — R35.0 URINARY FREQUENCY: ICD-10-CM

## 2024-04-03 RX ORDER — TAMSULOSIN HYDROCHLORIDE 0.4 MG/1
0.8 CAPSULE ORAL DAILY
Qty: 180 CAPSULE | Refills: 0 | Status: SHIPPED | OUTPATIENT
Start: 2024-04-03 | End: 2024-05-20

## 2024-05-20 ENCOUNTER — VIRTUAL VISIT (OUTPATIENT)
Dept: UROLOGY | Facility: CLINIC | Age: 54
End: 2024-05-20
Payer: COMMERCIAL

## 2024-05-20 DIAGNOSIS — R35.0 BENIGN PROSTATIC HYPERPLASIA WITH URINARY FREQUENCY: Primary | ICD-10-CM

## 2024-05-20 DIAGNOSIS — R35.0 URINARY FREQUENCY: ICD-10-CM

## 2024-05-20 DIAGNOSIS — N40.1 BENIGN PROSTATIC HYPERPLASIA WITH URINARY FREQUENCY: Primary | ICD-10-CM

## 2024-05-20 DIAGNOSIS — R39.11 URINARY HESITANCY: ICD-10-CM

## 2024-05-20 PROCEDURE — 99213 OFFICE O/P EST LOW 20 MIN: CPT | Mod: 95 | Performed by: PHYSICIAN ASSISTANT

## 2024-05-20 RX ORDER — TAMSULOSIN HYDROCHLORIDE 0.4 MG/1
0.8 CAPSULE ORAL DAILY
Qty: 180 CAPSULE | Refills: 0 | Status: SHIPPED | OUTPATIENT
Start: 2024-05-20 | End: 2024-07-08

## 2024-05-20 RX ORDER — FINASTERIDE 5 MG/1
5 TABLET, FILM COATED ORAL DAILY
Qty: 90 TABLET | Refills: 3 | Status: SHIPPED | OUTPATIENT
Start: 2024-05-20

## 2024-05-20 ASSESSMENT — ENCOUNTER SYMPTOMS
VOMITING: 0
CHILLS: 0
NAUSEA: 0
SHORTNESS OF BREATH: 0
DYSURIA: 0
FREQUENCY: 1
HEMATURIA: 0
FEVER: 0
DIFFICULTY URINATING: 1

## 2024-05-20 ASSESSMENT — PAIN SCALES - GENERAL: PAINLEVEL: NO PAIN (0)

## 2024-05-20 NOTE — LETTER
5/20/2024       RE: Go Sen  [address]  [place] [address]     Dear Colleague,    Thank you for referring your patient, Go Sen, to the [facility] [place] at [facility]. Please see a copy of my visit note below.    Virtual Visit Details    Type of service:  Video Visit     Originating Location (pt. Location): Home    Distant Location (provider location):  On-site  Platform used for Video Visit: Jessica    ON: 1503  OFF: 1516    CHIEF COMPLAINT/REASON FOR VISIT   Follow up on BPH with urinary urgency and frequency    HISTORY OF PRESENT ILLNESS   Mr. Sen is very pleasant 54 year old year old male, who presents today for follow-up on BPH with urinary urgency and frequency.  He is due for a refill on his Flomax.  He was last seen by Dr. Rosas on 03/10/2023.  He underwent cystoscopy at that time which showed a 3.5 cm by lobar obstructive urethra without significant median lobe but intravesical circumferential protrusion.  Mild trabeculations without cellules or diverticuli.  Patient initially saw myself in February 2023.  He had been on Flomax 0.8 mg/day for several years.    We had discussed adding on additional medication such as finasteride or evaluate with cystoscopy.    Dr. Rosas did recommend consideration of an outlet procedure given his young age and concern for worsening sexual function.  He was recommended to undergo Rezum.  There was some delay in scheduling this due to staffing issues.  He was then reached out to in December 2023 about scheduling this.    In discussion with the patient, he is leaning more to trying an additional medication.  He is again wondering what the side effects can be with finasteride.    He does feel like his urgency and frequency are about the same.  He denies any hematuria or dysuria.  He does feel like he empties his bladder.  Urinary stream is adequate.    He does find that if he  times his fluid and relaxes more when he urinates that it is much easier and feels like he empties better.    Patient's last PSA was obtained in November 2022.    The following portions of the patient's history were reviewed and updated as appropriate: allergies, current medications, past family history, past medical history, past social history, past surgical history, and problem list.     REVIEW OF SYSTEMS   Review of Systems   Constitutional:  Negative for chills and fever.   Respiratory:  Negative for shortness of breath.    Cardiovascular:  Negative for chest pain.   Gastrointestinal:  Negative for nausea and vomiting.   Genitourinary:  Positive for difficulty urinating, frequency and urgency. Negative for dysuria and hematuria.      Per HPI.     Patient Active Problem List   Diagnosis    Psoriasis    Vitamin D deficiency    Simple renal cyst    Cervical spondylosis without myelopathy    Elevated alanine aminotransferase (ALT) level    Benign prostatic hyperplasia with urinary frequency      Past Medical History:   Diagnosis Date    Epididymitis, right         Objective     PHYSICAL EXAM   GENERAL: alert and no distress  EYES: Eyes grossly normal to inspection.  No discharge or erythema, or obvious scleral/conjunctival abnormalities.  HENT: Normal cephalic/atraumatic.  External ears, nose and mouth without ulcers or lesions.  No nasal drainage visible.  NECK: No asymmetry, visible masses or scars  RESP: No audible wheeze, cough, or visible cyanosis.    MS: No gross musculoskeletal defects noted.  Normal range of motion.  No visible edema.  SKIN: Visible skin clear. No significant rash, abnormal pigmentation or lesions.  NEURO: Cranial nerves grossly intact.  Mentation and speech appropriate for age.  PSYCH: Appropriate affect, tone, and pace of words     LABORATORY     Recent Labs   Lab Test 03/10/23  1349   COLOR Yellow   APPEARANCE Clear   URINEGLC Negative   URINEBILI Negative   URINEKETONE Negative   SG  1.015   UBLD Negative   URINEPH 6.0   PROTEIN Negative   UROBILINOGEN 0.2   NITRITE Negative   LEUKEST Negative     Lab Results   Component Value Date    PSA 2.58 11/11/2022    PSA 1.7 05/21/2021       Assessment & Plan   1. Benign prostatic hyperplasia with urinary frequency    2. Urinary frequency    3. Urinary hesitancy        I had the pleasure today of meeting with Mr. Sen to discuss his BPH.  Patient has urgency and frequency of urination as well as some hesitancy.  He feels like his symptoms are relatively stable.  When I initially saw him in February 2023, postvoid residual was 110 mL on Flomax 0.8 mg.  He underwent cystoscopy which does show bilateral hypertrophy.  Dr. Rosas recommended consideration of an outlet procedure such as Rezum due to possible risk of worsening sexual function in young man.  Patient initially tried to schedule this, but there was difficulties on our end. The,n he was again reached out to her in December.  Patient notes that he is possibly more interested in trialing finasteride then a procedure at this time.    We did discuss the possible side effects with finasteride including prevention of male pattern baldness, breast development or tenderness, erectile dysfunction, decreased libido, and decreasing PSA to half.  Additionally, take 6 to 9 months to see full effect.  I did discuss with the patient that erectile dysfunction may not be reversible with stopping the medications.  After discussion, he still more inclined to consider a trial of finasteride.    We did discuss possibility that he would still be a candidate for Rezum.  We also discussed that Aquablation eventually becoming, and he may be a good candidate for this as well for an outlet procedure.    Patient is due for an updated PSA.    Will plan on the following:    -Continue Flomax 0.8 mg daily.  Refills provided for 1 year.    -Start finasteride 5 mg once daily.  This medication will take 6 to 9 months to see full  effect.    -If side effects with finasteride, discontinue.  Then can contact us if interested in possible Rezum or Aquablation.    -Plan on updated PSA in the next 1 to 2 weeks.    -Otherwise, if doing well, follow-up in 1 year with office visit and postvoid residual    Signed by:       Fabiana Louis PA-C 5/20/2024 3:03 PM

## 2024-05-20 NOTE — PATIENT INSTRUCTIONS
-Continue Flomax 0.8 mg daily.  Refills provided for 1 year.    -Start finasteride 5 mg once daily.  This medication will take 6 to 9 months to see full effect.You have been given 5 alpha reductase inhibitor in the form of finasteride.  This is used to shrink the prostate.  It can take 6 to 9 months to see the full effect.  Possible side effects of this medication include prevention of male pattern baldness, decrease in libido/sex drive, possible erectile dysfunction, decrease in the PSA, and possible breast development or tenderness.  There is always the possibility of retrograde ejaculation (some ejaculate goes into the bladder) with any medication used to treat an enlarged prostate.     -If side effects with finasteride, discontinue.  Then can contact us if interested in possible Rezum or Aquablation.    -Plan on updated PSA in the next 1 to 2 weeks.    -Otherwise, if doing well, follow-up in 1 year with office visit and postvoid residual    Contact us in the interim with questions, concerns, or changes in symptomatology.  [phone]

## 2024-05-20 NOTE — NURSING NOTE
Is the patient currently in the state of [place]? YES    Visit mode:VIDEO    If the visit is dropped, the patient can be reconnected by: VIDEO VISIT: Text to cell phone:   Telephone Information:   Mobile [phone]       Will anyone else be joining the visit? NO  (If patient encounters technical issues they should call [phone]    How would you like to obtain your AVS? MyChart    Are changes needed to the allergy or medication list? No    Are refills needed on medications prescribed by this physician? NO    Reason for visit: Follow Up    Sirena BURRELL

## 2024-05-20 NOTE — PROGRESS NOTES
Virtual Visit Details    Type of service:  Video Visit     Originating Location (pt. Location): Home    Distant Location (provider location):  On-site  Platform used for Video Visit: Jessica    ON: 1503  OFF: 1516    CHIEF COMPLAINT/REASON FOR VISIT   Follow up on BPH with urinary urgency and frequency    HISTORY OF PRESENT ILLNESS   Mr. Sen is very pleasant 54 year old year old male, who presents today for follow-up on BPH with urinary urgency and frequency.  He is due for a refill on his Flomax.  He was last seen by Dr. Rosas on 03/10/2023.  He underwent cystoscopy at that time which showed a 3.5 cm by lobar obstructive urethra without significant median lobe but intravesical circumferential protrusion.  Mild trabeculations without cellules or diverticuli.  Patient initially saw myself in February 2023.  He had been on Flomax 0.8 mg/day for several years.    We had discussed adding on additional medication such as finasteride or evaluate with cystoscopy.    Dr. Rosas did recommend consideration of an outlet procedure given his young age and concern for worsening sexual function.  He was recommended to undergo Rezum.  There was some delay in scheduling this due to staffing issues.  He was then reached out to in December 2023 about scheduling this.    In discussion with the patient, he is leaning more to trying an additional medication.  He is again wondering what the side effects can be with finasteride.    He does feel like his urgency and frequency are about the same.  He denies any hematuria or dysuria.  He does feel like he empties his bladder.  Urinary stream is adequate.    He does find that if he times his fluid and relaxes more when he urinates that it is much easier and feels like he empties better.    Patient's last PSA was obtained in November 2022.    The following portions of the patient's history were reviewed and updated as appropriate: allergies, current medications, past family history, past  medical history, past social history, past surgical history, and problem list.     REVIEW OF SYSTEMS   Review of Systems   Constitutional:  Negative for chills and fever.   Respiratory:  Negative for shortness of breath.    Cardiovascular:  Negative for chest pain.   Gastrointestinal:  Negative for nausea and vomiting.   Genitourinary:  Positive for difficulty urinating, frequency and urgency. Negative for dysuria and hematuria.      Per HPI.     Patient Active Problem List   Diagnosis    Psoriasis    Vitamin D deficiency    Simple renal cyst    Cervical spondylosis without myelopathy    Elevated alanine aminotransferase (ALT) level    Benign prostatic hyperplasia with urinary frequency      Past Medical History:   Diagnosis Date    Epididymitis, right         Objective      PHYSICAL EXAM   GENERAL: alert and no distress  EYES: Eyes grossly normal to inspection.  No discharge or erythema, or obvious scleral/conjunctival abnormalities.  HENT: Normal cephalic/atraumatic.  External ears, nose and mouth without ulcers or lesions.  No nasal drainage visible.  NECK: No asymmetry, visible masses or scars  RESP: No audible wheeze, cough, or visible cyanosis.    MS: No gross musculoskeletal defects noted.  Normal range of motion.  No visible edema.  SKIN: Visible skin clear. No significant rash, abnormal pigmentation or lesions.  NEURO: Cranial nerves grossly intact.  Mentation and speech appropriate for age.  PSYCH: Appropriate affect, tone, and pace of words     LABORATORY     Recent Labs   Lab Test 03/10/23  1349   COLOR Yellow   APPEARANCE Clear   URINEGLC Negative   URINEBILI Negative   URINEKETONE Negative   SG 1.015   UBLD Negative   URINEPH 6.0   PROTEIN Negative   UROBILINOGEN 0.2   NITRITE Negative   LEUKEST Negative     Lab Results   Component Value Date    PSA 2.58 11/11/2022    PSA 1.7 05/21/2021       Assessment & Plan    1. Benign prostatic hyperplasia with urinary frequency    2. Urinary frequency    3.  Urinary hesitancy        I had the pleasure today of meeting with Mr. Sen to discuss his BPH.  Patient has urgency and frequency of urination as well as some hesitancy.  He feels like his symptoms are relatively stable.  When I initially saw him in February 2023, postvoid residual was 110 mL on Flomax 0.8 mg.  He underwent cystoscopy which does show bilateral hypertrophy.  Dr. Rosas recommended consideration of an outlet procedure such as Rezum due to possible risk of worsening sexual function in young man.  Patient initially tried to schedule this, but there was difficulties on our end. The,n he was again reached out to her in December.  Patient notes that he is possibly more interested in trialing finasteride then a procedure at this time.    We did discuss the possible side effects with finasteride including prevention of male pattern baldness, breast development or tenderness, erectile dysfunction, decreased libido, and decreasing PSA to half.  Additionally, take 6 to 9 months to see full effect.  I did discuss with the patient that erectile dysfunction may not be reversible with stopping the medications.  After discussion, he still more inclined to consider a trial of finasteride.    We did discuss possibility that he would still be a candidate for Rezum.  We also discussed that Aquablation eventually becoming, and he may be a good candidate for this as well for an outlet procedure.    Patient is due for an updated PSA.    Will plan on the following:    -Continue Flomax 0.8 mg daily.  Refills provided for 1 year.    -Start finasteride 5 mg once daily.  This medication will take 6 to 9 months to see full effect.    -If side effects with finasteride, discontinue.  Then can contact us if interested in possible Rezum or Aquablation.    -Plan on updated PSA in the next 1 to 2 weeks.    -Otherwise, if doing well, follow-up in 1 year with office visit and postvoid residual    Signed by:       Fabiana Louis  JAZZ 5/20/2024 3:03 PM

## 2024-05-22 ENCOUNTER — TELEPHONE (OUTPATIENT)
Dept: UROLOGY | Facility: CLINIC | Age: 54
End: 2024-05-22
Payer: COMMERCIAL

## 2024-05-22 NOTE — TELEPHONE ENCOUNTER
----- Message from Erica Brito sent at 5/22/2024  9:01 AM CDT -----  Regarding: Lab work  PSA in 1-2 weeks    [facility]  5/20/24

## 2024-05-31 ENCOUNTER — LAB (OUTPATIENT)
Dept: LAB | Facility: CLINIC | Age: 54
End: 2024-05-31
Payer: COMMERCIAL

## 2024-05-31 DIAGNOSIS — R35.0 BENIGN PROSTATIC HYPERPLASIA WITH URINARY FREQUENCY: ICD-10-CM

## 2024-05-31 DIAGNOSIS — N40.1 BENIGN PROSTATIC HYPERPLASIA WITH URINARY FREQUENCY: ICD-10-CM

## 2024-05-31 PROCEDURE — 84153 ASSAY OF PSA TOTAL: CPT

## 2024-05-31 PROCEDURE — 36415 COLL VENOUS BLD VENIPUNCTURE: CPT

## 2024-06-02 LAB — PSA SERPL DL<=0.01 NG/ML-MCNC: 3.62 NG/ML (ref 0–3.5)

## 2024-06-03 DIAGNOSIS — R35.0 BENIGN PROSTATIC HYPERPLASIA WITH URINARY FREQUENCY: Primary | ICD-10-CM

## 2024-06-03 DIAGNOSIS — N40.1 BENIGN PROSTATIC HYPERPLASIA WITH URINARY FREQUENCY: Primary | ICD-10-CM

## 2024-06-03 DIAGNOSIS — R97.20 ELEVATED PROSTATE SPECIFIC ANTIGEN (PSA): ICD-10-CM

## 2024-06-04 ENCOUNTER — TELEPHONE (OUTPATIENT)
Dept: UROLOGY | Facility: CLINIC | Age: 54
End: 2024-06-04
Payer: COMMERCIAL

## 2024-07-08 DIAGNOSIS — R39.11 URINARY HESITANCY: ICD-10-CM

## 2024-07-08 DIAGNOSIS — R35.0 URINARY FREQUENCY: ICD-10-CM

## 2024-07-08 RX ORDER — TAMSULOSIN HYDROCHLORIDE 0.4 MG/1
0.8 CAPSULE ORAL DAILY
Qty: 180 CAPSULE | Refills: 2 | Status: SHIPPED | OUTPATIENT
Start: 2024-07-08

## 2024-08-07 ENCOUNTER — TELEPHONE (OUTPATIENT)
Dept: UROLOGY | Facility: CLINIC | Age: 54
End: 2024-08-07
Payer: COMMERCIAL

## 2024-08-12 ENCOUNTER — LAB (OUTPATIENT)
Dept: LAB | Facility: CLINIC | Age: 54
End: 2024-08-12
Payer: COMMERCIAL

## 2024-08-12 DIAGNOSIS — R35.0 BENIGN PROSTATIC HYPERPLASIA WITH URINARY FREQUENCY: ICD-10-CM

## 2024-08-12 DIAGNOSIS — N40.1 BENIGN PROSTATIC HYPERPLASIA WITH URINARY FREQUENCY: ICD-10-CM

## 2024-08-12 DIAGNOSIS — R97.20 ELEVATED PROSTATE SPECIFIC ANTIGEN (PSA): ICD-10-CM

## 2024-08-12 LAB — PSA SERPL DL<=0.01 NG/ML-MCNC: 0.96 NG/ML (ref 0–3.5)

## 2024-08-12 PROCEDURE — 36415 COLL VENOUS BLD VENIPUNCTURE: CPT

## 2024-08-12 PROCEDURE — 84153 ASSAY OF PSA TOTAL: CPT

## 2025-02-01 ENCOUNTER — HEALTH MAINTENANCE LETTER (OUTPATIENT)
Age: 55
End: 2025-02-01

## 2025-04-08 DIAGNOSIS — R39.11 URINARY HESITANCY: ICD-10-CM

## 2025-04-08 DIAGNOSIS — R35.0 URINARY FREQUENCY: ICD-10-CM

## 2025-04-08 RX ORDER — TAMSULOSIN HYDROCHLORIDE 0.4 MG/1
0.8 CAPSULE ORAL DAILY
Qty: 120 CAPSULE | Refills: 0 | Status: SHIPPED | OUTPATIENT
Start: 2025-04-08

## 2025-04-23 ENCOUNTER — MYC REFILL (OUTPATIENT)
Dept: UROLOGY | Facility: CLINIC | Age: 55
End: 2025-04-23
Payer: COMMERCIAL

## 2025-04-23 DIAGNOSIS — R39.11 URINARY HESITANCY: ICD-10-CM

## 2025-04-23 DIAGNOSIS — R35.0 URINARY FREQUENCY: ICD-10-CM

## 2025-04-23 RX ORDER — TAMSULOSIN HYDROCHLORIDE 0.4 MG/1
0.8 CAPSULE ORAL DAILY
Qty: 120 CAPSULE | Refills: 0 | OUTPATIENT
Start: 2025-04-23

## 2025-06-19 DIAGNOSIS — R39.11 URINARY HESITANCY: ICD-10-CM

## 2025-06-19 DIAGNOSIS — R35.0 URINARY FREQUENCY: ICD-10-CM

## 2025-06-19 RX ORDER — TAMSULOSIN HYDROCHLORIDE 0.4 MG/1
0.8 CAPSULE ORAL DAILY
Qty: 120 CAPSULE | Refills: 0 | Status: SHIPPED | OUTPATIENT
Start: 2025-06-19

## 2025-07-07 ENCOUNTER — VIRTUAL VISIT (OUTPATIENT)
Dept: UROLOGY | Facility: CLINIC | Age: 55
End: 2025-07-07
Payer: COMMERCIAL

## 2025-07-07 DIAGNOSIS — N40.1 BENIGN PROSTATIC HYPERPLASIA WITH URINARY FREQUENCY: Primary | ICD-10-CM

## 2025-07-07 DIAGNOSIS — R35.0 BENIGN PROSTATIC HYPERPLASIA WITH URINARY FREQUENCY: Primary | ICD-10-CM

## 2025-07-07 DIAGNOSIS — R39.11 URINARY HESITANCY: ICD-10-CM

## 2025-07-07 DIAGNOSIS — R35.0 URINARY FREQUENCY: ICD-10-CM

## 2025-07-07 PROCEDURE — 1126F AMNT PAIN NOTED NONE PRSNT: CPT | Mod: 95 | Performed by: PHYSICIAN ASSISTANT

## 2025-07-07 PROCEDURE — 98005 SYNCH AUDIO-VIDEO EST LOW 20: CPT | Performed by: PHYSICIAN ASSISTANT

## 2025-07-07 RX ORDER — TAMSULOSIN HYDROCHLORIDE 0.4 MG/1
0.8 CAPSULE ORAL DAILY
Qty: 120 CAPSULE | Refills: 3 | Status: SHIPPED | OUTPATIENT
Start: 2025-07-07

## 2025-07-07 ASSESSMENT — ENCOUNTER SYMPTOMS
FREQUENCY: 1
HEMATURIA: 0
DIFFICULTY URINATING: 1
DYSURIA: 0
CONSTITUTIONAL NEGATIVE: 1

## 2025-07-07 ASSESSMENT — PAIN SCALES - GENERAL: PAINLEVEL_OUTOF10: NO PAIN (0)

## 2025-07-07 NOTE — LETTER
7/7/2025       RE: Go Sen  [address]  [place] [address]     Dear Colleague,    Thank you for referring your patient, Go Sen, to the [facility] [place] at [facility]. Please see a copy of my visit note below.    Go is a 55 year old who is being evaluated via a billable video visit.      Video-Visit Details    Type of service:  Video Visit   Originating Location (pt. Location): Home    Distant Location (provider location):  On-site  Platform used for Video Visit: HotClickVideo     ON: 1253  OFF: 1300    CHIEF COMPLAINT/REASON FOR VISIT   Follow up on BPH    HISTORY OF PRESENT ILLNESS   Mr. Sen is very pleasant 55 year old year old male, who presents today for follow-up regarding BPH.  Patient has urgency and frequency of urination.  He was last seen by myself on 05/20/2024.  He has been on Flomax 0.8 mg daily for several years.  At our last visit, we discussed consideration of an outlet procedure given his young age or possible adding on of medication.  Patient was inclined to start an additional medication.    Patient started finasteride 5 mg daily in conjunction with his Flomax 0.4 mg.  He did take this for 8 months.  He is no longer taking it.  He did not really see any benefit in his urinary symptoms with this medication.    He does note that his urinary symptoms continue to fluctuate.  He is still having some urgency and frequency of urination.  He feels that he empties his bladder and denies any hematuria or dysuria.  Does like his symptoms are relatively the same, but he is leaning towards possible outlet procedure.    Cystoscopy with Dr. Rosas in March 2023 showed 3.5 cm bilobar obstructive urethra without significant median lobe but intravesical circumferential protrusion with mild trabeculations.  He had recommended patient consider Rezum.  We had also talked about Aquablation at his last visit.    PSA decreased  appropriately and most recently was 0.96 in August 2024.    The following portions of the patient's history were reviewed and updated as appropriate: allergies, current medications, past family history, past medical history, past social history, past surgical history, and problem list.     REVIEW OF SYSTEMS   Review of Systems   Constitutional: Negative.    Genitourinary:  Positive for difficulty urinating, frequency and urgency. Negative for dysuria and hematuria.      Per HPI.     Patient Active Problem List   Diagnosis     Psoriasis     Vitamin D deficiency     Simple renal cyst     Cervical spondylosis without myelopathy     Elevated alanine aminotransferase (ALT) level     Benign prostatic hyperplasia with urinary frequency      Past Medical History:   Diagnosis Date     Epididymitis, right       Objective     PHYSICAL EXAM   GENERAL: alert and no distress  EYES: Eyes grossly normal to inspection.  No discharge or erythema, or obvious scleral/conjunctival abnormalities.  HENT: Normal cephalic/atraumatic.  External ears, nose and mouth without ulcers or lesions.  No nasal drainage visible.  NECK: No asymmetry, visible masses or scars  RESP: No audible wheeze, cough, or visible cyanosis.    MS: No gross musculoskeletal defects noted.  Normal range of motion.  No visible edema.  SKIN: Visible skin clear. No significant rash, abnormal pigmentation or lesions.  NEURO: Cranial nerves grossly intact.  Mentation and speech appropriate for age.  PSYCH: Appropriate affect, tone, and pace of words     LABORATORY     Recent Labs   Lab Test 03/10/23  1349   COLOR Yellow   APPEARANCE Clear   URINEGLC Negative   URINEBILI Negative   URINEKETONE Negative   SG 1.015   UBLD Negative   URINEPH 6.0   PROTEIN Negative   UROBILINOGEN 0.2   NITRITE Negative   LEUKEST Negative     Lab Results   Component Value Date    PSA 0.96 08/12/2024    PSA 3.62 (H) 05/31/2024    PSA 2.58 11/11/2022    PSA 1.7 05/21/2021       Assessment & Plan    1. Benign prostatic hyperplasia with urinary frequency    2. Urinary frequency    3. Urinary hesitancy        I had the pleasure today of meeting with Mr. Sen to discuss his follow-up for his urination.  He did not note any improvement after trialing a 5 alpha reductase inhibitor or approximately 8 months.  He is continuing to utilize his Flomax 0.4 mg once daily.  He is leaning towards possible outlet procedure at this time given difficulties with urination.    We again discussed that the less invasive procedures would be Rezum or Aquablation.  He would like to discuss these further with his wife.    In the interim, we will plan on the following:    - Continue with Flomax 0.8 mg daily.  Refills provided for 1 year.    -Discussed with your wife further about possible outlet procedure.  Would consider Rezum or Aquablation based upon previous discussions.    -Please let us know if you are interested in one of the surgical interventions.    -Otherwise, would plan on follow-up in 1 year with PSA, urinalysis, postvoid residual, and symptom check if procedure is not done.    Signed by:       Fabiana Louis PA-C 7/7/2025 12:49 PM      .      Again, thank you for allowing me to participate in the care of your patient.      Sincerely,    Fabiana Louis PA-C

## 2025-07-07 NOTE — PATIENT INSTRUCTIONS
- Continue with Flomax 0.8 mg daily.  Refills provided for 1 year.    -Discussed with your wife further about possible outlet procedure.  Would consider Rezum or Aquablation based upon previous discussions.    [email or web address]    [email or web address]    -Please let us know if you are interested in one of the surgical interventions.    -Otherwise, would plan on follow-up in 1 year with PSA, urinalysis, postvoid residual, and symptom check if procedure is not done.    Contact us in the interim with questions, concerns, or changes in symptomatology.  [phone]

## 2025-07-07 NOTE — NURSING NOTE
Current patient location: [address] [place] [address]    Is the patient currently in the state of [place]? YES    Visit mode: VIDEO    If the visit is dropped, the patient can be reconnected by:VIDEO VISIT: Text to cell phone:   Telephone Information:   Mobile [phone]       Will anyone else be joining the visit? NO  (If patient encounters technical issues they should call [phone])    Are changes needed to the allergy or medication list? No    Are refills needed on medications prescribed by this physician? NO    Rooming Documentation:  Questionnaire(s) not pre-assigned    Reason for visit: Follow Up    Pippa BURRELL    Pt states insurance is now [employer] through work. Pt unsure of plan name, ID, group numbers. Will update once card is received.    Include Location In Plan?: No Detail Level: Generalized Detail Level: Simple

## 2025-07-07 NOTE — PROGRESS NOTES
Go is a 55 year old who is being evaluated via a billable video visit.      Video-Visit Details    Type of service:  Video Visit   Originating Location (pt. Location): Home    Distant Location (provider location):  On-site  Platform used for Video Visit: Jessica     ON: 1253  OFF: 1300    CHIEF COMPLAINT/REASON FOR VISIT   Follow up on BPH    HISTORY OF PRESENT ILLNESS   Mr. Sen is very pleasant 55 year old year old male, who presents today for follow-up regarding BPH.  Patient has urgency and frequency of urination.  He was last seen by myself on 05/20/2024.  He has been on Flomax 0.8 mg daily for several years.  At our last visit, we discussed consideration of an outlet procedure given his young age or possible adding on of medication.  Patient was inclined to start an additional medication.    Patient started finasteride 5 mg daily in conjunction with his Flomax 0.4 mg.  He did take this for 8 months.  He is no longer taking it.  He did not really see any benefit in his urinary symptoms with this medication.    He does note that his urinary symptoms continue to fluctuate.  He is still having some urgency and frequency of urination.  He feels that he empties his bladder and denies any hematuria or dysuria.  Does like his symptoms are relatively the same, but he is leaning towards possible outlet procedure.    Cystoscopy with Dr. Rosas in March 2023 showed 3.5 cm bilobar obstructive urethra without significant median lobe but intravesical circumferential protrusion with mild trabeculations.  He had recommended patient consider Rezum.  We had also talked about Aquablation at his last visit.    PSA decreased appropriately and most recently was 0.96 in August 2024.    The following portions of the patient's history were reviewed and updated as appropriate: allergies, current medications, past family history, past medical history, past social history, past surgical history, and problem list.     REVIEW OF  SYSTEMS   Review of Systems   Constitutional: Negative.    Genitourinary:  Positive for difficulty urinating, frequency and urgency. Negative for dysuria and hematuria.      Per HPI.     Patient Active Problem List   Diagnosis    Psoriasis    Vitamin D deficiency    Simple renal cyst    Cervical spondylosis without myelopathy    Elevated alanine aminotransferase (ALT) level    Benign prostatic hyperplasia with urinary frequency      Past Medical History:   Diagnosis Date    Epididymitis, right       Objective      PHYSICAL EXAM   GENERAL: alert and no distress  EYES: Eyes grossly normal to inspection.  No discharge or erythema, or obvious scleral/conjunctival abnormalities.  HENT: Normal cephalic/atraumatic.  External ears, nose and mouth without ulcers or lesions.  No nasal drainage visible.  NECK: No asymmetry, visible masses or scars  RESP: No audible wheeze, cough, or visible cyanosis.    MS: No gross musculoskeletal defects noted.  Normal range of motion.  No visible edema.  SKIN: Visible skin clear. No significant rash, abnormal pigmentation or lesions.  NEURO: Cranial nerves grossly intact.  Mentation and speech appropriate for age.  PSYCH: Appropriate affect, tone, and pace of words     LABORATORY     Recent Labs   Lab Test 03/10/23  1349   COLOR Yellow   APPEARANCE Clear   URINEGLC Negative   URINEBILI Negative   URINEKETONE Negative   SG 1.015   UBLD Negative   URINEPH 6.0   PROTEIN Negative   UROBILINOGEN 0.2   NITRITE Negative   LEUKEST Negative     Lab Results   Component Value Date    PSA 0.96 08/12/2024    PSA 3.62 (H) 05/31/2024    PSA 2.58 11/11/2022    PSA 1.7 05/21/2021       Assessment & Plan    1. Benign prostatic hyperplasia with urinary frequency    2. Urinary frequency    3. Urinary hesitancy        I had the pleasure today of meeting with Mr. Sen to discuss his follow-up for his urination.  He did not note any improvement after trialing a 5 alpha reductase inhibitor or approximately 8  months.  He is continuing to utilize his Flomax 0.4 mg once daily.  He is leaning towards possible outlet procedure at this time given difficulties with urination.    We again discussed that the less invasive procedures would be Rezum or Aquablation.  He would like to discuss these further with his wife.    In the interim, we will plan on the following:    - Continue with Flomax 0.8 mg daily.  Refills provided for 1 year.    -Discussed with your wife further about possible outlet procedure.  Would consider Rezum or Aquablation based upon previous discussions.    -Please let us know if you are interested in one of the surgical interventions.    -Otherwise, would plan on follow-up in 1 year with PSA, urinalysis, postvoid residual, and symptom check if procedure is not done.    Signed by:       Fabiana Louis PA-C 7/7/2025 12:49 PM

## 2025-08-12 ENCOUNTER — HOSPITAL ENCOUNTER (OUTPATIENT)
Dept: CT IMAGING | Facility: CLINIC | Age: 55
Discharge: HOME | End: 2025-08-12
Attending: PHYSICIAN ASSISTANT
Payer: COMMERCIAL

## 2025-08-12 ENCOUNTER — OFFICE VISIT (OUTPATIENT)
Dept: URGENT CARE | Facility: URGENT CARE | Age: 55
End: 2025-08-12
Payer: COMMERCIAL

## 2025-08-12 ENCOUNTER — OFFICE VISIT (OUTPATIENT)
Dept: PEDIATRICS | Facility: CLINIC | Age: 55
End: 2025-08-12
Payer: COMMERCIAL

## 2025-08-12 VITALS
RESPIRATION RATE: 18 BRPM | HEIGHT: 71 IN | BODY MASS INDEX: 32.97 KG/M2 | SYSTOLIC BLOOD PRESSURE: 130 MMHG | DIASTOLIC BLOOD PRESSURE: 82 MMHG | HEART RATE: 62 BPM | WEIGHT: 235.5 LBS | OXYGEN SATURATION: 98 % | TEMPERATURE: 97.8 F

## 2025-08-12 VITALS
HEART RATE: 63 BPM | TEMPERATURE: 98 F | DIASTOLIC BLOOD PRESSURE: 87 MMHG | OXYGEN SATURATION: 98 % | BODY MASS INDEX: 32.78 KG/M2 | WEIGHT: 235 LBS | SYSTOLIC BLOOD PRESSURE: 138 MMHG

## 2025-08-12 DIAGNOSIS — R10.32 LLQ ABDOMINAL PAIN: Primary | ICD-10-CM

## 2025-08-12 DIAGNOSIS — R11.0 NAUSEA: ICD-10-CM

## 2025-08-12 DIAGNOSIS — K43.9 VENTRAL HERNIA WITHOUT OBSTRUCTION OR GANGRENE: ICD-10-CM

## 2025-08-12 DIAGNOSIS — R10.84 ABDOMINAL PAIN, GENERALIZED: ICD-10-CM

## 2025-08-12 DIAGNOSIS — R10.32 LLQ ABDOMINAL PAIN: ICD-10-CM

## 2025-08-12 DIAGNOSIS — K52.9 COLITIS: Primary | ICD-10-CM

## 2025-08-12 DIAGNOSIS — R53.81 MALAISE: ICD-10-CM

## 2025-08-12 LAB
ALBUMIN SERPL BCG-MCNC: 4.8 G/DL (ref 3.5–5.2)
ALBUMIN UR-MCNC: NEGATIVE MG/DL
ALP SERPL-CCNC: 82 U/L (ref 40–150)
ALT SERPL W P-5'-P-CCNC: 31 U/L (ref 0–70)
ANION GAP SERPL CALCULATED.3IONS-SCNC: 12 MMOL/L (ref 7–15)
APPEARANCE UR: CLEAR
AST SERPL W P-5'-P-CCNC: 22 U/L (ref 0–45)
BASOPHILS # BLD AUTO: 0.04 10E3/UL (ref 0–0.2)
BASOPHILS NFR BLD AUTO: 0.5 %
BILIRUB SERPL-MCNC: 0.7 MG/DL
BILIRUB UR QL STRIP: NEGATIVE
BUN SERPL-MCNC: 16.3 MG/DL (ref 6–20)
CALCIUM SERPL-MCNC: 9.3 MG/DL (ref 8.8–10.4)
CHLORIDE SERPL-SCNC: 100 MMOL/L (ref 98–107)
COLOR UR AUTO: ABNORMAL
CREAT SERPL-MCNC: 0.98 MG/DL (ref 0.67–1.17)
EGFRCR SERPLBLD CKD-EPI 2021: >90 ML/MIN/1.73M2
EOSINOPHIL # BLD AUTO: 0.12 10E3/UL (ref 0–0.7)
EOSINOPHIL NFR BLD AUTO: 1.6 %
ERYTHROCYTE [DISTWIDTH] IN BLOOD BY AUTOMATED COUNT: 12.3 % (ref 10–15)
GLUCOSE SERPL-MCNC: 90 MG/DL (ref 70–99)
GLUCOSE UR STRIP-MCNC: NEGATIVE MG/DL
HCO3 SERPL-SCNC: 28 MMOL/L (ref 22–29)
HCT VFR BLD AUTO: 44 % (ref 40–53)
HGB BLD-MCNC: 15 G/DL (ref 13.3–17.7)
HGB UR QL STRIP: NEGATIVE
IMM GRANULOCYTES # BLD: 0.02 10E3/UL
IMM GRANULOCYTES NFR BLD: 0.3 %
KETONES UR STRIP-MCNC: NEGATIVE MG/DL
LEUKOCYTE ESTERASE UR QL STRIP: NEGATIVE
LYMPHOCYTES # BLD AUTO: 1.93 10E3/UL (ref 0.8–5.3)
LYMPHOCYTES NFR BLD AUTO: 25.1 %
MCH RBC QN AUTO: 29.8 PG (ref 26.5–33)
MCHC RBC AUTO-ENTMCNC: 34.1 G/DL (ref 31.5–36.5)
MCV RBC AUTO: 87.5 FL (ref 78–100)
MONOCYTES # BLD AUTO: 0.39 10E3/UL (ref 0–1.3)
MONOCYTES NFR BLD AUTO: 5.1 %
MUCOUS THREADS #/AREA URNS LPF: PRESENT /LPF
NEUTROPHILS # BLD AUTO: 5.19 10E3/UL (ref 1.6–8.3)
NEUTROPHILS NFR BLD AUTO: 67.4 %
NITRATE UR QL: NEGATIVE
NRBC # BLD AUTO: 0 10E3/UL
NRBC BLD AUTO-RTO: 0 /100
PH UR STRIP: 6.5 [PH] (ref 5–7)
PLATELET # BLD AUTO: 176 10E3/UL (ref 150–450)
POTASSIUM SERPL-SCNC: 4 MMOL/L (ref 3.4–5.3)
PROT SERPL-MCNC: 7.4 G/DL (ref 6.4–8.3)
RBC # BLD AUTO: 5.03 10E6/UL (ref 4.4–5.9)
RBC URINE: 0 /HPF
SODIUM SERPL-SCNC: 140 MMOL/L (ref 135–145)
SP GR UR STRIP: 1.02 (ref 1–1.03)
UROBILINOGEN UR STRIP-MCNC: NORMAL MG/DL
WBC # BLD AUTO: 7.69 10E3/UL (ref 4–11)
WBC URINE: 1 /HPF

## 2025-08-12 PROCEDURE — 99207 PR FIRST ORDER ACUTE REFERRAL: CPT | Performed by: NURSE PRACTITIONER

## 2025-08-12 PROCEDURE — 250N000011 HC RX IP 250 OP 636: Performed by: PHYSICIAN ASSISTANT

## 2025-08-12 PROCEDURE — 74177 CT ABD & PELVIS W/CONTRAST: CPT

## 2025-08-12 PROCEDURE — 80053 COMPREHEN METABOLIC PANEL: CPT | Performed by: PHYSICIAN ASSISTANT

## 2025-08-12 PROCEDURE — 85025 COMPLETE CBC W/AUTO DIFF WBC: CPT | Performed by: PHYSICIAN ASSISTANT

## 2025-08-12 PROCEDURE — 250N000009 HC RX 250: Performed by: PHYSICIAN ASSISTANT

## 2025-08-12 PROCEDURE — 3079F DIAST BP 80-89 MM HG: CPT | Performed by: PHYSICIAN ASSISTANT

## 2025-08-12 PROCEDURE — 96374 THER/PROPH/DIAG INJ IV PUSH: CPT | Performed by: PHYSICIAN ASSISTANT

## 2025-08-12 PROCEDURE — 3075F SYST BP GE 130 - 139MM HG: CPT | Performed by: NURSE PRACTITIONER

## 2025-08-12 PROCEDURE — 1125F AMNT PAIN NOTED PAIN PRSNT: CPT | Performed by: NURSE PRACTITIONER

## 2025-08-12 PROCEDURE — 36415 COLL VENOUS BLD VENIPUNCTURE: CPT | Performed by: PHYSICIAN ASSISTANT

## 2025-08-12 PROCEDURE — 99215 OFFICE O/P EST HI 40 MIN: CPT | Mod: 25 | Performed by: PHYSICIAN ASSISTANT

## 2025-08-12 PROCEDURE — 3075F SYST BP GE 130 - 139MM HG: CPT | Performed by: PHYSICIAN ASSISTANT

## 2025-08-12 PROCEDURE — 81001 URINALYSIS AUTO W/SCOPE: CPT | Performed by: PHYSICIAN ASSISTANT

## 2025-08-12 PROCEDURE — 3079F DIAST BP 80-89 MM HG: CPT | Performed by: NURSE PRACTITIONER

## 2025-08-12 RX ORDER — IOPAMIDOL 755 MG/ML
500 INJECTION, SOLUTION INTRAVASCULAR ONCE
Status: COMPLETED | OUTPATIENT
Start: 2025-08-12 | End: 2025-08-12

## 2025-08-12 RX ORDER — DICYCLOMINE HCL 20 MG
20 TABLET ORAL 4 TIMES DAILY PRN
Qty: 40 TABLET | Refills: 0 | Status: SHIPPED | OUTPATIENT
Start: 2025-08-12

## 2025-08-12 RX ORDER — ONDANSETRON 2 MG/ML
4 INJECTION INTRAMUSCULAR; INTRAVENOUS
Status: ACTIVE | OUTPATIENT
Start: 2025-08-12 | End: 2025-08-13

## 2025-08-12 RX ORDER — ONDANSETRON 4 MG/1
4 TABLET, ORALLY DISINTEGRATING ORAL EVERY 8 HOURS PRN
Qty: 12 TABLET | Refills: 0 | Status: SHIPPED | OUTPATIENT
Start: 2025-08-12

## 2025-08-12 RX ADMIN — SODIUM CHLORIDE 100 ML: 9 INJECTION, SOLUTION INTRAVENOUS at 12:47

## 2025-08-12 RX ADMIN — ONDANSETRON 4 MG: 2 INJECTION INTRAMUSCULAR; INTRAVENOUS at 12:35

## 2025-08-12 RX ADMIN — IOPAMIDOL 100 ML: 755 INJECTION, SOLUTION INTRAVENOUS at 12:47

## 2025-08-12 ASSESSMENT — PAIN SCALES - GENERAL: PAINLEVEL_OUTOF10: MODERATE PAIN (6)

## 2025-08-22 ENCOUNTER — OFFICE VISIT (OUTPATIENT)
Dept: FAMILY MEDICINE | Facility: CLINIC | Age: 55
End: 2025-08-22
Payer: COMMERCIAL

## 2025-08-22 VITALS
HEIGHT: 71 IN | OXYGEN SATURATION: 96 % | WEIGHT: 231.9 LBS | SYSTOLIC BLOOD PRESSURE: 124 MMHG | HEART RATE: 70 BPM | BODY MASS INDEX: 32.47 KG/M2 | TEMPERATURE: 97.1 F | RESPIRATION RATE: 14 BRPM | DIASTOLIC BLOOD PRESSURE: 80 MMHG

## 2025-08-22 DIAGNOSIS — Z00.00 ROUTINE GENERAL MEDICAL EXAMINATION AT A HEALTH CARE FACILITY: Primary | ICD-10-CM

## 2025-08-22 DIAGNOSIS — K52.9 COLITIS: ICD-10-CM

## 2025-08-22 DIAGNOSIS — F43.10 PTSD (POST-TRAUMATIC STRESS DISORDER): ICD-10-CM

## 2025-08-22 DIAGNOSIS — Z12.11 SCREEN FOR COLON CANCER: ICD-10-CM

## 2025-08-22 PROCEDURE — 3079F DIAST BP 80-89 MM HG: CPT | Performed by: FAMILY MEDICINE

## 2025-08-22 PROCEDURE — 90471 IMMUNIZATION ADMIN: CPT | Performed by: FAMILY MEDICINE

## 2025-08-22 PROCEDURE — 1126F AMNT PAIN NOTED NONE PRSNT: CPT | Performed by: FAMILY MEDICINE

## 2025-08-22 PROCEDURE — 99396 PREV VISIT EST AGE 40-64: CPT | Mod: 25 | Performed by: FAMILY MEDICINE

## 2025-08-22 PROCEDURE — 90677 PCV20 VACCINE IM: CPT | Performed by: FAMILY MEDICINE

## 2025-08-22 PROCEDURE — 3074F SYST BP LT 130 MM HG: CPT | Performed by: FAMILY MEDICINE

## 2025-08-22 PROCEDURE — 99213 OFFICE O/P EST LOW 20 MIN: CPT | Mod: 25 | Performed by: FAMILY MEDICINE

## 2025-08-22 SDOH — HEALTH STABILITY: PHYSICAL HEALTH: ON AVERAGE, HOW MANY MINUTES DO YOU ENGAGE IN EXERCISE AT THIS LEVEL?: 60 MIN

## 2025-08-22 SDOH — HEALTH STABILITY: PHYSICAL HEALTH: ON AVERAGE, HOW MANY DAYS PER WEEK DO YOU ENGAGE IN MODERATE TO STRENUOUS EXERCISE (LIKE A BRISK WALK)?: 6 DAYS

## 2025-08-22 ASSESSMENT — PAIN SCALES - GENERAL: PAINLEVEL_OUTOF10: NO PAIN (0)
